# Patient Record
Sex: FEMALE | Race: WHITE | NOT HISPANIC OR LATINO | Employment: OTHER | URBAN - METROPOLITAN AREA
[De-identification: names, ages, dates, MRNs, and addresses within clinical notes are randomized per-mention and may not be internally consistent; named-entity substitution may affect disease eponyms.]

---

## 2023-07-12 ENCOUNTER — APPOINTMENT (OUTPATIENT)
Dept: PRIMARY CARE | Facility: CLINIC | Age: 83
End: 2023-07-12
Payer: MEDICARE

## 2023-07-12 DIAGNOSIS — I10 PRIMARY HYPERTENSION: Primary | ICD-10-CM

## 2023-07-12 DIAGNOSIS — K21.9 GASTROESOPHAGEAL REFLUX DISEASE WITHOUT ESOPHAGITIS: ICD-10-CM

## 2023-07-12 DIAGNOSIS — R41.89 COGNITIVE DECLINE: ICD-10-CM

## 2023-07-12 PROCEDURE — 99213 OFFICE O/P EST LOW 20 MIN: CPT | Performed by: INTERNAL MEDICINE

## 2023-07-12 PROCEDURE — 1157F ADVNC CARE PLAN IN RCRD: CPT | Performed by: INTERNAL MEDICINE

## 2023-07-25 ENCOUNTER — OFFICE VISIT (OUTPATIENT)
Dept: PRIMARY CARE | Facility: CLINIC | Age: 83
End: 2023-07-25
Payer: MEDICARE

## 2023-07-25 VITALS — SYSTOLIC BLOOD PRESSURE: 133 MMHG | DIASTOLIC BLOOD PRESSURE: 76 MMHG

## 2023-07-25 DIAGNOSIS — S09.90XA TRAUMATIC INJURY OF HEAD, INITIAL ENCOUNTER: ICD-10-CM

## 2023-07-25 DIAGNOSIS — I10 PRIMARY HYPERTENSION: Primary | ICD-10-CM

## 2023-07-25 DIAGNOSIS — M79.602 PAIN OF LEFT UPPER EXTREMITY: ICD-10-CM

## 2023-07-25 PROCEDURE — 99213 OFFICE O/P EST LOW 20 MIN: CPT | Performed by: INTERNAL MEDICINE

## 2023-07-25 PROCEDURE — 3078F DIAST BP <80 MM HG: CPT | Performed by: INTERNAL MEDICINE

## 2023-07-25 PROCEDURE — 1126F AMNT PAIN NOTED NONE PRSNT: CPT | Performed by: INTERNAL MEDICINE

## 2023-07-25 PROCEDURE — 3075F SYST BP GE 130 - 139MM HG: CPT | Performed by: INTERNAL MEDICINE

## 2023-07-25 PROCEDURE — 1157F ADVNC CARE PLAN IN RCRD: CPT | Performed by: INTERNAL MEDICINE

## 2023-07-25 NOTE — PROGRESS NOTES
Subjective   Patient ID: Teresita De La Paz is a 83 y.o. female who presents for No chief complaint on file..    HPI came in with daughter was in her memory care unit the daughter had seen her the day before and she was okay but the next morning she had swelling of her left side of her face her left arm and tooth was chipped evidently having had a fall in the middle of the night onto her outstretched left arm because there was some markings on the inner portion of her left palm no chest pain no shortness of breath eating okay    Review of Systems    Objective   There were no vitals taken for this visit.    Physical Exam vital signs noted alert repetitive speech ecchymoses over the left cheek to upper teeth chipped large dorsal left forearm bruise no JVD chest clear to auscultation CV regular rate and rhythm S1-S2 extremities no clubbing cyanosis or edema    Assessment/Plan impression htn fall other diagnoses head injury arm pain  Plan this was the second fall in 2 months advised on daughter decluttering the area continuing with current treatments consideration for other therapies at home no change bp management tylenol as needed for head and arm follow-up with the dentist good nutrition hydration and recheck

## 2023-07-29 PROBLEM — I10 BENIGN ESSENTIAL HYPERTENSION: Status: ACTIVE | Noted: 2018-08-22

## 2024-06-12 NOTE — PROGRESS NOTES
Subjective   Patient ID: Teresita De La Paz is a 84 y.o. female who presents for No chief complaint on file..    HPI Met patient for first time see updated front sheet town no chest pain no shortness of breath memory issues stomach has been okay    Past medical history dementia GERD hypertension    Medications aspirin vitamin B12 Aricept lisinopril omeprazole    Allergies Zithromax furosemide sulfa?    Social history no tobacco    Prevention discussed with Di safety and living situation    Review of Systems    Objective   There were no vitals taken for this visit.    Physical Exam vital signs noted blood pressures have been running high normal in the 140+ systolic range sometimes lower for all records alert and oriented NCAT no JVD chest clear to auscultation CV regular rate and rhythm S1-S2 abdomen soft nontender normal active bowel sounds extremities no clubbing cyanosis or edema normal distal pulses    Assessment/Plan impression GERD hypertension dementia   plan continue  With making new surroundings familiar and safe  Continue with memory medication will recheck on weights and blood pressures after  Living accommodations and food source for 1 month continue with medication for cardiovascular general avoidance of NSAIDs already does not smoke and drink continue with PPI then recheck as above

## 2025-01-12 ENCOUNTER — APPOINTMENT (OUTPATIENT)
Dept: RADIOLOGY | Facility: HOSPITAL | Age: 85
End: 2025-01-12
Payer: MEDICAID

## 2025-01-12 ENCOUNTER — HOSPITAL ENCOUNTER (INPATIENT)
Facility: HOSPITAL | Age: 85
LOS: 2 days | Discharge: HOME | End: 2025-01-15
Attending: EMERGENCY MEDICINE | Admitting: FAMILY MEDICINE
Payer: MEDICAID

## 2025-01-12 ENCOUNTER — APPOINTMENT (OUTPATIENT)
Dept: CARDIOLOGY | Facility: HOSPITAL | Age: 85
End: 2025-01-12
Payer: MEDICAID

## 2025-01-12 DIAGNOSIS — R00.1 SINUS BRADYCARDIA: ICD-10-CM
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
## 2025-01-12 DIAGNOSIS — R09.02 HYPOXIA: ICD-10-CM

## 2025-01-12 DIAGNOSIS — I10 BENIGN ESSENTIAL HYPERTENSION: ICD-10-CM

## 2025-01-12 LAB
ALBUMIN SERPL BCP-MCNC: 3.8 G/DL (ref 3.4–5)
ALP SERPL-CCNC: 89 U/L (ref 33–136)
ALT SERPL W P-5'-P-CCNC: 8 U/L (ref 7–45)
ANION GAP SERPL CALC-SCNC: 11 MMOL/L (ref 10–20)
AST SERPL W P-5'-P-CCNC: 12 U/L (ref 9–39)
BASOPHILS # BLD AUTO: 0.02 X10*3/UL (ref 0–0.1)
BASOPHILS NFR BLD AUTO: 0.3 %
BILIRUB SERPL-MCNC: 0.3 MG/DL (ref 0–1.2)
BUN SERPL-MCNC: 23 MG/DL (ref 6–23)
CALCIUM SERPL-MCNC: 9.1 MG/DL (ref 8.6–10.3)
CARDIAC TROPONIN I PNL SERPL HS: 11 NG/L (ref 0–13)
CARDIAC TROPONIN I PNL SERPL HS: 3 NG/L (ref 0–13)
CHLORIDE SERPL-SCNC: 104 MMOL/L (ref 98–107)
CO2 SERPL-SCNC: 27 MMOL/L (ref 21–32)
CREAT SERPL-MCNC: 1.17 MG/DL (ref 0.5–1.05)
EGFRCR SERPLBLD CKD-EPI 2021: 46 ML/MIN/1.73M*2
EOSINOPHIL # BLD AUTO: 0.16 X10*3/UL (ref 0–0.4)
EOSINOPHIL NFR BLD AUTO: 2.3 %
ERYTHROCYTE [DISTWIDTH] IN BLOOD BY AUTOMATED COUNT: 12.5 % (ref 11.5–14.5)
GLUCOSE SERPL-MCNC: 107 MG/DL (ref 74–99)
HCT VFR BLD AUTO: 38.5 % (ref 36–46)
HGB BLD-MCNC: 12.4 G/DL (ref 12–16)
IMM GRANULOCYTES # BLD AUTO: 0.02 X10*3/UL (ref 0–0.5)
IMM GRANULOCYTES NFR BLD AUTO: 0.3 % (ref 0–0.9)
LYMPHOCYTES # BLD AUTO: 1.6 X10*3/UL (ref 0.8–3)
LYMPHOCYTES NFR BLD AUTO: 23.3 %
MCH RBC QN AUTO: 30.7 PG (ref 26–34)
MCHC RBC AUTO-ENTMCNC: 32.2 G/DL (ref 32–36)
MCV RBC AUTO: 95 FL (ref 80–100)
MONOCYTES # BLD AUTO: 0.73 X10*3/UL (ref 0.05–0.8)
MONOCYTES NFR BLD AUTO: 10.6 %
NEUTROPHILS # BLD AUTO: 4.34 X10*3/UL (ref 1.6–5.5)
NEUTROPHILS NFR BLD AUTO: 63.2 %
NRBC BLD-RTO: 0 /100 WBCS (ref 0–0)
PLATELET # BLD AUTO: 290 X10*3/UL (ref 150–450)
POTASSIUM SERPL-SCNC: 4.1 MMOL/L (ref 3.5–5.3)
PROT SERPL-MCNC: 6 G/DL (ref 6.4–8.2)
RBC # BLD AUTO: 4.04 X10*6/UL (ref 4–5.2)
SODIUM SERPL-SCNC: 138 MMOL/L (ref 136–145)
WBC # BLD AUTO: 6.9 X10*3/UL (ref 4.4–11.3)

## 2025-01-12 PROCEDURE — 93005 ELECTROCARDIOGRAM TRACING: CPT

## 2025-01-12 PROCEDURE — 85025 COMPLETE CBC W/AUTO DIFF WBC: CPT | Performed by: EMERGENCY MEDICINE

## 2025-01-12 PROCEDURE — 70450 CT HEAD/BRAIN W/O DYE: CPT

## 2025-01-12 PROCEDURE — 90715 TDAP VACCINE 7 YRS/> IM: CPT | Performed by: EMERGENCY MEDICINE

## 2025-01-12 PROCEDURE — 73564 X-RAY EXAM KNEE 4 OR MORE: CPT | Mod: RIGHT SIDE | Performed by: STUDENT IN AN ORGANIZED HEALTH CARE EDUCATION/TRAINING PROGRAM

## 2025-01-12 PROCEDURE — 36415 COLL VENOUS BLD VENIPUNCTURE: CPT | Performed by: EMERGENCY MEDICINE

## 2025-01-12 PROCEDURE — 73130 X-RAY EXAM OF HAND: CPT | Mod: LT

## 2025-01-12 PROCEDURE — 71045 X-RAY EXAM CHEST 1 VIEW: CPT

## 2025-01-12 PROCEDURE — 72170 X-RAY EXAM OF PELVIS: CPT

## 2025-01-12 PROCEDURE — 84484 ASSAY OF TROPONIN QUANT: CPT | Performed by: EMERGENCY MEDICINE

## 2025-01-12 PROCEDURE — 76376 3D RENDER W/INTRP POSTPROCES: CPT | Performed by: SURGERY

## 2025-01-12 PROCEDURE — 70486 CT MAXILLOFACIAL W/O DYE: CPT

## 2025-01-12 PROCEDURE — 2500000001 HC RX 250 WO HCPCS SELF ADMINISTERED DRUGS (ALT 637 FOR MEDICARE OP): Performed by: EMERGENCY MEDICINE

## 2025-01-12 PROCEDURE — 72170 X-RAY EXAM OF PELVIS: CPT | Performed by: STUDENT IN AN ORGANIZED HEALTH CARE EDUCATION/TRAINING PROGRAM

## 2025-01-12 PROCEDURE — 70486 CT MAXILLOFACIAL W/O DYE: CPT | Performed by: SURGERY

## 2025-01-12 PROCEDURE — 90471 IMMUNIZATION ADMIN: CPT | Performed by: EMERGENCY MEDICINE

## 2025-01-12 PROCEDURE — 71045 X-RAY EXAM CHEST 1 VIEW: CPT | Performed by: STUDENT IN AN ORGANIZED HEALTH CARE EDUCATION/TRAINING PROGRAM

## 2025-01-12 PROCEDURE — 72125 CT NECK SPINE W/O DYE: CPT | Performed by: SURGERY

## 2025-01-12 PROCEDURE — 2500000004 HC RX 250 GENERAL PHARMACY W/ HCPCS (ALT 636 FOR OP/ED): Performed by: EMERGENCY MEDICINE

## 2025-01-12 PROCEDURE — 70450 CT HEAD/BRAIN W/O DYE: CPT | Performed by: SURGERY

## 2025-01-12 PROCEDURE — 99285 EMERGENCY DEPT VISIT HI MDM: CPT | Mod: 25 | Performed by: EMERGENCY MEDICINE

## 2025-01-12 PROCEDURE — 73564 X-RAY EXAM KNEE 4 OR MORE: CPT | Mod: RT

## 2025-01-12 PROCEDURE — 73130 X-RAY EXAM OF HAND: CPT | Mod: LEFT SIDE | Performed by: STUDENT IN AN ORGANIZED HEALTH CARE EDUCATION/TRAINING PROGRAM

## 2025-01-12 PROCEDURE — 83880 ASSAY OF NATRIURETIC PEPTIDE: CPT | Performed by: NURSE PRACTITIONER

## 2025-01-12 PROCEDURE — 82565 ASSAY OF CREATININE: CPT | Performed by: EMERGENCY MEDICINE

## 2025-01-12 PROCEDURE — 76377 3D RENDER W/INTRP POSTPROCES: CPT

## 2025-01-12 PROCEDURE — 72125 CT NECK SPINE W/O DYE: CPT

## 2025-01-12 RX ORDER — LIDOCAINE HYDROCHLORIDE 10 MG/ML
10 INJECTION, SOLUTION INFILTRATION; PERINEURAL ONCE
Status: COMPLETED | OUTPATIENT
Start: 2025-01-12 | End: 2025-01-13

## 2025-01-12 RX ORDER — OXYMETAZOLINE HCL 0.05 %
2 SPRAY, NON-AEROSOL (ML) NASAL ONCE
Status: COMPLETED | OUTPATIENT
Start: 2025-01-12 | End: 2025-01-12

## 2025-01-12 RX ORDER — ACETAMINOPHEN 325 MG/1
975 TABLET ORAL ONCE
Status: COMPLETED | OUTPATIENT
Start: 2025-01-12 | End: 2025-01-12

## 2025-01-12 RX ADMIN — OXYMETAZOLINE HYDROCHLORIDE 2 SPRAY: 0.5 SPRAY NASAL at 22:31

## 2025-01-12 RX ADMIN — ACETAMINOPHEN 975 MG: 325 TABLET, FILM COATED ORAL at 22:30

## 2025-01-12 RX ADMIN — TETANUS TOXOID, REDUCED DIPHTHERIA TOXOID AND ACELLULAR PERTUSSIS VACCINE, ADSORBED 0.5 ML: 5; 2.5; 8; 8; 2.5 SUSPENSION INTRAMUSCULAR at 22:32

## 2025-01-12 ASSESSMENT — PAIN DESCRIPTION - LOCATION: LOCATION: FINGER (COMMENT WHICH ONE)

## 2025-01-12 ASSESSMENT — PAIN SCALES - GENERAL: PAINLEVEL_OUTOF10: 1

## 2025-01-12 ASSESSMENT — COLUMBIA-SUICIDE SEVERITY RATING SCALE - C-SSRS
1. IN THE PAST MONTH, HAVE YOU WISHED YOU WERE DEAD OR WISHED YOU COULD GO TO SLEEP AND NOT WAKE UP?: NO
2. HAVE YOU ACTUALLY HAD ANY THOUGHTS OF KILLING YOURSELF?: NO
6. HAVE YOU EVER DONE ANYTHING, STARTED TO DO ANYTHING, OR PREPARED TO DO ANYTHING TO END YOUR LIFE?: NO

## 2025-01-12 ASSESSMENT — PAIN DESCRIPTION - PROGRESSION: CLINICAL_PROGRESSION: NOT CHANGED

## 2025-01-12 ASSESSMENT — PAIN - FUNCTIONAL ASSESSMENT: PAIN_FUNCTIONAL_ASSESSMENT: 0-10

## 2025-01-13 ENCOUNTER — APPOINTMENT (OUTPATIENT)
Dept: CARDIOLOGY | Facility: HOSPITAL | Age: 85
End: 2025-01-13
Payer: MEDICAID
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
## 2025-01-13 LAB
APPEARANCE UR: CLEAR
ATRIAL RATE: 82 BPM
ATRIAL RATE: 88 BPM
BILIRUB UR STRIP.AUTO-MCNC: NEGATIVE MG/DL
BNP SERPL-MCNC: 16 PG/ML (ref 0–99)
COLOR UR: NORMAL
FLUAV RNA RESP QL NAA+PROBE: NOT DETECTED
FLUBV RNA RESP QL NAA+PROBE: NOT DETECTED
GLUCOSE UR STRIP.AUTO-MCNC: NORMAL MG/DL
KETONES UR STRIP.AUTO-MCNC: NEGATIVE MG/DL
LEUKOCYTE ESTERASE UR QL STRIP.AUTO: NEGATIVE
NITRITE UR QL STRIP.AUTO: NEGATIVE
P AXIS: 66 DEGREES
P AXIS: 73 DEGREES
P OFFSET: 195 MS
P OFFSET: 200 MS
P ONSET: 135 MS
P ONSET: 138 MS
PH UR STRIP.AUTO: 5.5 [PH]
PR INTERVAL: 170 MS
PR INTERVAL: 176 MS
PROT UR STRIP.AUTO-MCNC: NEGATIVE MG/DL
Q ONSET: 223 MS
Q ONSET: 223 MS
QRS COUNT: 13 BEATS
QRS COUNT: 14 BEATS
QRS DURATION: 136 MS
QRS DURATION: 144 MS
QT INTERVAL: 388 MS
QT INTERVAL: 422 MS
QTC CALCULATION(BAZETT): 469 MS
QTC CALCULATION(BAZETT): 493 MS
QTC FREDERICIA: 441 MS
QTC FREDERICIA: 468 MS
R AXIS: 79 DEGREES
R AXIS: 87 DEGREES
RBC # UR STRIP.AUTO: NEGATIVE /UL
SARS-COV-2 RNA RESP QL NAA+PROBE: NOT DETECTED
SP GR UR STRIP.AUTO: 1.01
T AXIS: 23 DEGREES
T AXIS: 38 DEGREES
T OFFSET: 417 MS
T OFFSET: 434 MS
UROBILINOGEN UR STRIP.AUTO-MCNC: NORMAL MG/DL
VENTRICULAR RATE: 82 BPM
VENTRICULAR RATE: 88 BPM

## 2025-01-13 PROCEDURE — 93005 ELECTROCARDIOGRAM TRACING: CPT

## 2025-01-13 PROCEDURE — 99255 IP/OBS CONSLTJ NEW/EST HI 80: CPT | Performed by: STUDENT IN AN ORGANIZED HEALTH CARE EDUCATION/TRAINING PROGRAM

## 2025-01-13 PROCEDURE — 87636 SARSCOV2 & INF A&B AMP PRB: CPT | Performed by: EMERGENCY MEDICINE

## 2025-01-13 PROCEDURE — 2500000004 HC RX 250 GENERAL PHARMACY W/ HCPCS (ALT 636 FOR OP/ED): Performed by: EMERGENCY MEDICINE

## 2025-01-13 PROCEDURE — 81003 URINALYSIS AUTO W/O SCOPE: CPT | Performed by: EMERGENCY MEDICINE

## 2025-01-13 PROCEDURE — 1200000002 HC GENERAL ROOM WITH TELEMETRY DAILY

## 2025-01-13 PROCEDURE — 2500000001 HC RX 250 WO HCPCS SELF ADMINISTERED DRUGS (ALT 637 FOR MEDICARE OP): Performed by: EMERGENCY MEDICINE

## 2025-01-13 PROCEDURE — 2500000005 HC RX 250 GENERAL PHARMACY W/O HCPCS: Performed by: NURSE PRACTITIONER

## 2025-01-13 PROCEDURE — 2500000004 HC RX 250 GENERAL PHARMACY W/ HCPCS (ALT 636 FOR OP/ED): Performed by: NURSE PRACTITIONER

## 2025-01-13 PROCEDURE — 0XQRXZZ REPAIR LEFT MIDDLE FINGER, EXTERNAL APPROACH: ICD-10-PCS | Performed by: EMERGENCY MEDICINE

## 2025-01-13 PROCEDURE — 0XQTXZZ REPAIR LEFT RING FINGER, EXTERNAL APPROACH: ICD-10-PCS | Performed by: EMERGENCY MEDICINE

## 2025-01-13 RX ORDER — POLYETHYLENE GLYCOL 3350 17 G/17G
17 POWDER, FOR SOLUTION ORAL DAILY
Status: DISCONTINUED | OUTPATIENT
Start: 2025-01-13 | End: 2025-01-15 | Stop reason: HOSPADM

## 2025-01-13 RX ORDER — PANTOPRAZOLE SODIUM 40 MG/1
40 TABLET, DELAYED RELEASE ORAL
Status: DISCONTINUED | OUTPATIENT
Start: 2025-01-14 | End: 2025-01-15 | Stop reason: HOSPADM

## 2025-01-13 RX ORDER — ARIPIPRAZOLE 2 MG/1
2 TABLET ORAL DAILY
COMMUNITY

## 2025-01-13 RX ORDER — TALC
3 POWDER (GRAM) TOPICAL NIGHTLY
Status: DISCONTINUED | OUTPATIENT
Start: 2025-01-13 | End: 2025-01-15 | Stop reason: HOSPADM

## 2025-01-13 RX ORDER — CALCIUM CARBONATE 300MG(750)
400 TABLET,CHEWABLE ORAL DAILY
COMMUNITY
End: 2025-01-15 | Stop reason: HOSPADM

## 2025-01-13 RX ORDER — ONDANSETRON 4 MG/1
4 TABLET, FILM COATED ORAL EVERY 8 HOURS PRN
Status: DISCONTINUED | OUTPATIENT
Start: 2025-01-13 | End: 2025-01-15 | Stop reason: HOSPADM

## 2025-01-13 RX ORDER — OMEPRAZOLE 20 MG/1
1 CAPSULE, DELAYED RELEASE ORAL DAILY
COMMUNITY
Start: 2023-02-06

## 2025-01-13 RX ORDER — ONDANSETRON 4 MG/1
4 TABLET, FILM COATED ORAL EVERY 8 HOURS PRN
Status: CANCELLED | OUTPATIENT
Start: 2025-01-13

## 2025-01-13 RX ORDER — LISINOPRIL 10 MG/1
10 TABLET ORAL
COMMUNITY
End: 2025-01-15 | Stop reason: HOSPADM

## 2025-01-13 RX ORDER — ESTRADIOL 0.1 MG/G
1 CREAM VAGINAL
COMMUNITY
Start: 2023-02-06

## 2025-01-13 RX ORDER — AMMONIUM LACTATE 12 G/100G
1 CREAM TOPICAL AS NEEDED
COMMUNITY

## 2025-01-13 RX ORDER — BACITRACIN ZINC 500 UNIT/G
OINTMENT (GRAM) TOPICAL ONCE
Status: COMPLETED | OUTPATIENT
Start: 2025-01-13 | End: 2025-01-13

## 2025-01-13 RX ORDER — CHOLECALCIFEROL (VITAMIN D3) 50 MCG
50 TABLET ORAL DAILY
COMMUNITY

## 2025-01-13 RX ORDER — POLYETHYLENE GLYCOL 3350 17 G/17G
17 POWDER, FOR SOLUTION ORAL DAILY
Status: CANCELLED | OUTPATIENT
Start: 2025-01-13

## 2025-01-13 RX ORDER — CHOLECALCIFEROL (VITAMIN D3) 25 MCG
2000 TABLET ORAL DAILY
Status: DISCONTINUED | OUTPATIENT
Start: 2025-01-13 | End: 2025-01-15 | Stop reason: HOSPADM

## 2025-01-13 RX ORDER — ONDANSETRON HYDROCHLORIDE 2 MG/ML
4 INJECTION, SOLUTION INTRAVENOUS EVERY 8 HOURS PRN
Status: CANCELLED | OUTPATIENT
Start: 2025-01-13

## 2025-01-13 RX ORDER — VIT C/E/ZN/COPPR/LUTEIN/ZEAXAN 250MG-90MG
1000 CAPSULE ORAL DAILY
COMMUNITY

## 2025-01-13 RX ORDER — ESTRADIOL 0.1 MG/G
1 CREAM VAGINAL
Status: DISCONTINUED | OUTPATIENT
Start: 2025-01-13 | End: 2025-01-15 | Stop reason: HOSPADM

## 2025-01-13 RX ORDER — LANOLIN ALCOHOL/MO/W.PET/CERES
1000 CREAM (GRAM) TOPICAL DAILY
Status: DISCONTINUED | OUTPATIENT
Start: 2025-01-13 | End: 2025-01-15 | Stop reason: HOSPADM

## 2025-01-13 RX ORDER — ONDANSETRON HYDROCHLORIDE 2 MG/ML
4 INJECTION, SOLUTION INTRAVENOUS EVERY 8 HOURS PRN
Status: DISCONTINUED | OUTPATIENT
Start: 2025-01-13 | End: 2025-01-15 | Stop reason: HOSPADM

## 2025-01-13 RX ORDER — ENOXAPARIN SODIUM 100 MG/ML
40 INJECTION SUBCUTANEOUS EVERY 24 HOURS
Status: DISCONTINUED | OUTPATIENT
Start: 2025-01-13 | End: 2025-01-15 | Stop reason: HOSPADM

## 2025-01-13 RX ORDER — ARIPIPRAZOLE 2 MG/1
2 TABLET ORAL DAILY
Status: DISCONTINUED | OUTPATIENT
Start: 2025-01-14 | End: 2025-01-15 | Stop reason: HOSPADM

## 2025-01-13 RX ADMIN — BACITRACIN ZINC: 500 OINTMENT TOPICAL at 04:52

## 2025-01-13 RX ADMIN — Medication 3 MG: at 22:10

## 2025-01-13 RX ADMIN — LIDOCAINE HYDROCHLORIDE 10 ML: 10 INJECTION, SOLUTION INFILTRATION; PERINEURAL at 00:59

## 2025-01-13 RX ADMIN — ENOXAPARIN SODIUM 40 MG: 40 INJECTION SUBCUTANEOUS at 22:11

## 2025-01-13 SDOH — SOCIAL STABILITY: SOCIAL INSECURITY: ABUSE: ADULT

## 2025-01-13 SDOH — SOCIAL STABILITY: SOCIAL INSECURITY: DO YOU FEEL UNSAFE GOING BACK TO THE PLACE WHERE YOU ARE LIVING?: NO

## 2025-01-13 SDOH — SOCIAL STABILITY: SOCIAL INSECURITY: DO YOU FEEL ANYONE HAS EXPLOITED OR TAKEN ADVANTAGE OF YOU FINANCIALLY OR OF YOUR PERSONAL PROPERTY?: NO

## 2025-01-13 SDOH — SOCIAL STABILITY: SOCIAL INSECURITY: ARE THERE ANY APPARENT SIGNS OF INJURIES/BEHAVIORS THAT COULD BE RELATED TO ABUSE/NEGLECT?: NO

## 2025-01-13 SDOH — SOCIAL STABILITY: SOCIAL INSECURITY: HAVE YOU HAD ANY THOUGHTS OF HARMING ANYONE ELSE?: NO

## 2025-01-13 SDOH — SOCIAL STABILITY: SOCIAL INSECURITY: ARE YOU OR HAVE YOU BEEN THREATENED OR ABUSED PHYSICALLY, EMOTIONALLY, OR SEXUALLY BY ANYONE?: NO

## 2025-01-13 SDOH — SOCIAL STABILITY: SOCIAL INSECURITY: DOES ANYONE TRY TO KEEP YOU FROM HAVING/CONTACTING OTHER FRIENDS OR DOING THINGS OUTSIDE YOUR HOME?: NO

## 2025-01-13 SDOH — SOCIAL STABILITY: SOCIAL INSECURITY: HAS ANYONE EVER THREATENED TO HURT YOUR FAMILY OR YOUR PETS?: NO

## 2025-01-13 SDOH — SOCIAL STABILITY: SOCIAL INSECURITY: WERE YOU ABLE TO COMPLETE ALL THE BEHAVIORAL HEALTH SCREENINGS?: YES

## 2025-01-13 SDOH — SOCIAL STABILITY: SOCIAL INSECURITY: HAVE YOU HAD THOUGHTS OF HARMING ANYONE ELSE?: YES

## 2025-01-13 ASSESSMENT — COGNITIVE AND FUNCTIONAL STATUS - GENERAL
STANDING UP FROM CHAIR USING ARMS: A LITTLE
DAILY ACTIVITIY SCORE: 18
CLIMB 3 TO 5 STEPS WITH RAILING: A LOT
MOVING FROM LYING ON BACK TO SITTING ON SIDE OF FLAT BED WITH BEDRAILS: A LITTLE
DRESSING REGULAR LOWER BODY CLOTHING: A LITTLE
PERSONAL GROOMING: A LITTLE
DRESSING REGULAR UPPER BODY CLOTHING: A LITTLE
TURNING FROM BACK TO SIDE WHILE IN FLAT BAD: A LITTLE
MOVING TO AND FROM BED TO CHAIR: A LITTLE
WALKING IN HOSPITAL ROOM: A LITTLE
TOILETING: A LOT
MOBILITY SCORE: 17
PATIENT BASELINE BEDBOUND: NO
HELP NEEDED FOR BATHING: A LITTLE

## 2025-01-13 ASSESSMENT — ACTIVITIES OF DAILY LIVING (ADL)
WALKS IN HOME: NEEDS ASSISTANCE
DRESSING YOURSELF: NEEDS ASSISTANCE
GROOMING: NEEDS ASSISTANCE
TOILETING: NEEDS ASSISTANCE
JUDGMENT_ADEQUATE_SAFELY_COMPLETE_DAILY_ACTIVITIES: NO
PATIENT'S MEMORY ADEQUATE TO SAFELY COMPLETE DAILY ACTIVITIES?: NO
FEEDING YOURSELF: INDEPENDENT
ASSISTIVE_DEVICE: WALKER
ADEQUATE_TO_COMPLETE_ADL: YES
LACK_OF_TRANSPORTATION: PATIENT UNABLE TO ANSWER
HEARING - RIGHT EAR: FUNCTIONAL
BATHING: NEEDS ASSISTANCE
HEARING - LEFT EAR: FUNCTIONAL

## 2025-01-13 ASSESSMENT — LIFESTYLE VARIABLES
HOW OFTEN DO YOU HAVE A DRINK CONTAINING ALCOHOL: NEVER
HOW MANY STANDARD DRINKS CONTAINING ALCOHOL DO YOU HAVE ON A TYPICAL DAY: PATIENT DOES NOT DRINK

## 2025-01-13 ASSESSMENT — PAIN DESCRIPTION - PROGRESSION: CLINICAL_PROGRESSION: GRADUALLY IMPROVING

## 2025-01-13 ASSESSMENT — PAIN SCALES - GENERAL
PAINLEVEL_OUTOF10: 0 - NO PAIN

## 2025-01-13 ASSESSMENT — PAIN - FUNCTIONAL ASSESSMENT: PAIN_FUNCTIONAL_ASSESSMENT: 0-10

## 2025-01-13 ASSESSMENT — PATIENT HEALTH QUESTIONNAIRE - PHQ9: 1. LITTLE INTEREST OR PLEASURE IN DOING THINGS: NOT AT ALL

## 2025-01-13 NOTE — CONSULTS
Inpatient consult to Cardiology  Consult performed by: Raji PAK MD  Consult ordered by: Jak Cortez, APRN-CNP  Reason for consult: Fall, bradycardia, anemia        History Of Present Illness:    Teresita De La Paz is a 84 y.o. female with past medical history significant for Hypertension,  Sinus bradycardia, Right bundle branch block, Renal cell carcinoma s/p right nephrectomy 10/2017, Pulmonary nodule, history of falls, Alzheimer's dementia, GERD. Presented with unwitnessed fall. Cardiology is consulted for fall, bradydcardia, anemia.     Patient poor historian. Has underlying history of dementia.  No family at the bedside.  History obtained from chart review. According to ED note, patient presented with unwitnessed fall.  Patient only oriented to self at this time. Reportedly she was found down with inability to state what happened.   Her nose was bloody and she had a laceration to left fingers. EKG on presentation showed normal sinus rhythm HR 88 right bundle branch block. Chest x-ray showed no evidence of acute cardiopulmonary process. CT showed no evidence of acute intracranial abnormality.   No acute facial bone fracture.   No acute cervical spine fracture or malalignment.   XR hand left subtle questionable laceration in the 2nd left digit without associated radiopaque foreign body or acute bony injury and remote ulnar styloid fracture, with mild negative ulnar variance.       XR pelvis showed no evidence of acute fracture or traumatic malalignment of the pelvis. XR knee showed changes of right knee replacement, without evidence of perihardware failure or acute fracture/malalignment.  High sensitivity troponin 3 repeat 11  K 4.1 BUN 23 creatinine 1.17 WBC 6.9 hemoglobin 12.4 hematocrit 38.5 platelets 290.  Initial vital signs showed temp 36.2 HR 78 RR 16 /76 pulse ox 96% on room air She was treated with tylenol 975 mg oral x1.   Laceration was repaired.      According to ED note,patient has  "episode of bradycardia with HR 40s and pulse ox 90 while sleeping.  Repeat EKG showed SR HR 82 right bundle branch block. Reportedly she ambulated and heart rate dropped. Patient was asymptomatic. Given bradycardia, she was admitted for evaluation.   At present she denies any complains.      Of note, patient with prior history of sinus bradycardia. She was seen by inpatient cardiology team in 2021 for bradycardia. Echo at that time was stable.     Home CV meds  Lisinopril 10 mg daily  hydrochlorothiazide  12.5 mg daily          Last Recorded Vitals:  Vitals:    01/13/25 0941 01/13/25 1048 01/13/25 1254 01/13/25 1400   BP: 122/87 121/87 122/87 126/87   BP Location: Left arm Left arm Left arm Left arm   Patient Position: Sitting Sitting Sitting Sitting   Pulse: 87 81 81 68   Resp: 18 16 18 15   Temp:       TempSrc:       SpO2: 98% 97% 98% 98%   Weight:       Height:           Last Labs:  LABS:  CMP:  Results from last 7 days   Lab Units 01/12/25  2226   SODIUM mmol/L 138   POTASSIUM mmol/L 4.1   CHLORIDE mmol/L 104   CO2 mmol/L 27   ANION GAP mmol/L 11   BUN mg/dL 23   CREATININE mg/dL 1.17*   EGFR mL/min/1.73m*2 46*   ALBUMIN g/dL 3.8   ALT U/L 8   AST U/L 12   BILIRUBIN TOTAL mg/dL 0.3     CBC:  Results from last 7 days   Lab Units 01/12/25  2226   WBC AUTO x10*3/uL 6.9   HEMOGLOBIN g/dL 12.4   HEMATOCRIT % 38.5   PLATELETS AUTO x10*3/uL 290   MCV fL 95     COAG:     ABO: No results found for: \"ABO\"  HEME/ENDO:     CARDIAC:   Results from last 7 days   Lab Units 01/12/25  2326 01/12/25  2226   TROPHS ng/L 11 3   No results for input(s): \"CHOL\", \"LDLF\", \"LDL\", \"LDLCALC\", \"HDL\", \"TRIG\" in the last 21509 hours.     Imagine Results  XR hand left 3+ views   Final Result   1.  Subtle questionable laceration in the 2nd left digit without   associated radiopaque foreign body or acute bony injury.   2. Remote ulnar styloid fracture, with mild negative ulnar variance.                  MACRO:   None        Signed by: Ravi " Philip 1/12/2025 11:54 PM   Dictation workstation:   [id number]      XR pelvis 1-2 views   Final Result   No evidence of acute fracture or traumatic malalignment of the pelvis.             MACRO:   None        Signed by: Ravi Palacios 1/12/2025 11:31 PM   Dictation workstation:   [id number]      XR knee right 4+ views   Final Result   Changes of right knee replacement, without evidence of perihardware   failure or acute fracture/malalignment.             MACRO:   None        Signed by: Ravi Palacios 1/12/2025 11:29 PM   Dictation workstation:   [id number]      XR chest 1 view   Final Result   1.  No evidence of acute cardiopulmonary process.                  MACRO:   None        Signed by: Ravi Palacios 1/12/2025 11:30 PM   Dictation workstation:   [id number]      CT head wo IV contrast   Final Result   No evidence of acute intracranial abnormality.        No acute facial bone fracture.        No acute cervical spine fracture or malalignment.             MACRO:   None        Signed by: Manpreet Leigh 1/12/2025 11:01 PM   Dictation workstation:   [id number]      CT cervical spine wo IV contrast   Final Result   No evidence of acute intracranial abnormality.        No acute facial bone fracture.        No acute cervical spine fracture or malalignment.             MACRO:   None        Signed by: Manpreet Leigh 1/12/2025 11:01 PM   Dictation workstation:   [id number]      CT maxillofacial bones wo IV contrast   Final Result   No evidence of acute intracranial abnormality.        No acute facial bone fracture.        No acute cervical spine fracture or malalignment.             MACRO:   None        Signed by: Manpreet Leigh 1/12/2025 11:01 PM   Dictation workstation:   [id number]      CT 3D reconstruction   Final Result   No evidence of acute intracranial abnormality.        No acute facial bone fracture.        No acute cervical spine fracture or malalignment.             MACRO:   None         Signed by: Manpreet Leigh 2025 11:01 PM   Dictation workstation:   [id number]             Last I/O:  No intake/output data recorded.    Past Cardiology Tests (Last 3 Years):  EKG:  ECG 12 lead 2025       ECG 12 lead 2025       Echo:  2021   1. The left ventricular systolic function is normal with a 60-65% estimated ejection fraction.   2. Poorly visualized anatomical structures due to suboptimal image quality.   3. Spectral Doppler shows an impaired relaxation pattern of left ventricular diastolic filling.   4. No prior echocardiogram available for comparison.    2020- [facility]    1. The estimated EF is 55-65% consistent with normal LV function.    2. Grade 1 diastolic dysfunction pattern of LV diastolic filling.    3. Aortic valve is mild sclerotic.     Cath:  No results found for this or any previous visit from the past 1095 days.    Stress Test:  Echocardiogram stress 2021- [facility]    Summary:    1. Normal LVEF augmentation with stress > 70%.    2. Normal resting LVEF of 55%.    3. Normal stress echocardiogram imaging.    4. Normal stress electrocardiogram.    5. The blood pressure response was hypertensive.     Cardiac Imaging:  No results found for this or any previous visit from the past 1095 days.      Past Medical History:  She has a past medical history of Personal history of other diseases of the digestive system (2021) and Personal history of other diseases of the digestive system (2021).    Past Surgical History:  She has a past surgical history that includes Total knee arthroplasty (2017);  section, classic (2017); and Hysterectomy (2017).      Social History:  She has no history on file for tobacco use, alcohol use, and drug use.    Family History:  No family history on file.     Allergies:  Azithromycin, Lasix [furosemide], and Sulfa (sulfonamide antibiotics)    Inpatient Medications:  Scheduled medications   Medication Dose  Route Frequency    ARIPiprazole  2 mg oral Daily    cholecalciferol  2,000 Units oral Daily    cyanocobalamin  1,000 mcg oral Daily    enoxaparin  40 mg subcutaneous q24h    estradiol  1 g vaginal Once per day on Monday Thursday    melatonin  3 mg oral Nightly    [START ON 1/14/2025] pantoprazole  40 mg oral Daily before breakfast    polyethylene glycol  17 g oral Daily     PRN medications   Medication    ondansetron    Or    ondansetron     Continuous Medications   Medication Dose Last Rate     Outpatient Medications:  Current Outpatient Medications   Medication Instructions    ammonium lactate (Amlactin) 12 % cream 1 Application, Topical, As needed    ARIPiprazole (ABILIFY) 2 mg, oral, Daily, FOR MOOD    cholecalciferol (VITAMIN D-3) 50 mcg, oral, Daily    cyanocobalamin (VITAMIN B-12) 1,000 mcg, oral, Daily    estradiol (ESTRACE) 1 g    lisinopril 10 mg, oral, Daily RT    magnesium oxide (MAG-OX) 400 mg, oral, Daily    omeprazole (PriLOSEC) 20 mg DR capsule 1 capsule, Daily       Physical Exam:  GENERAL: alert x1 hx of dementia in no acute distress  SKIN: warm, dry  NECK: no JVD  CARDIAC: Regular rate and rhythm no murmurs  PULMONARY: Normal respiratory efforts, lungs clear to auscultation bilaterally.  ABDOMEN: soft, nondistended  EXTREMITIES: no lower extremity edema  NEURO: Alert and oriented x 3.  Grossly normal.  Moves all 4 extremities.      Assessment/Plan   Teresita De La Paz is a 84 y.o. female with past medical history significant for Hypertension,  Sinus bradycardia, Right bundle branch block, Renal cell carcinoma s/p right nephrectomy 10/2017, Pulmonary nodule, history of falls, Alzheimer's dementia, GERD. Presented with unwitnessed fall. Cardiology is consulted for fall, bradydcardia, anemia.     #Sinus bradycardia  No evidence of significant pauses or high grade AV block noted on telemetry.   Will check TSH   - We will obtain a transthoracic echocardiogram for structural evaluation including ejection  fraction, assessment of regional wall motion abnormalities or valvular disease, and further evaluation of hemodynamics.      #Lower extremity edema  Most consistent with venous insuffiencey    Chest xray with no pulmonary edema   Will check BNP     #HTN   BP acceptable    Recommendation  Echo   BNP   Compression stocking   Monitor on telemetry while inhouse   If no significant findings, will plan to discharge patient on real time event monitor   The patient will benefit from follow-up in Cardiology clinic within 4 weeks of discharge.     Code Status:  Full Code    Hernestokatie SHORT Mike, APRN-CNP   Thank you for allowing me to participate in their care.  Please feel free to call me with any further questions or concerns.    Raji Crum MD, FACC, ALISHA, [facility]  Division of Cardiovascular Medicine  System Director, Nuclear Cardiology   Medical Director, [facility] [place], [facility]   Clinical , [facility] [employer]  [email or web address]   Office:  [phone]          Both the MIKE and I have had a face to face encounter with the patient today. I have examined the patient and edited the documented physical examination as necessary.  I personally reviewed the patient's vital signs, telemetry, recent labs, medications, orders, EKGs, and pertinent cardiac imaging/ echocardiography.  I have reviewed the MIKE's encounter note, approve the MIKE's documentation and have edited the note to reflect my diagnostic and therapeutic plan.

## 2025-01-13 NOTE — PROGRESS NOTES
Transitional Care Coordination Progress Note:  Plan per Medical/Surgical team: treatment of fall with bloody nose, bit lip & lacerations to 2 middle fingers- sutured   Status: Inpatient   Payor source: medicaid   Discharge disposition:  [facility]  Finger laceration wound care   Lip wound care  Potential Barriers: dementia  ADOD: 1/15/2025  SANIA Mcdonald RN, BSN Transitional Care Coordinator ED# [phone]      01/13/25 0916   Discharge Planning   Living Arrangements Alone   Support Systems Children   Assistance Needed Finger laceration wound care   Lip wound care   Type of Residence Assisted living   Do you have animals or pets at home? No   Care Facility Name [facility]   Home or Post Acute Services Post acute facilities (Rehab/SNF/etc)   Type of Post Acute Facility Services Assisted living   Expected Discharge Disposition Home   Does the patient need discharge transport arranged? Yes   RoundTrip coordination needed? Yes   Has discharge transport been arranged? No   Financial Resource Strain   How hard is it for you to pay for the very basics like food, housing, medical care, and heating? Pt Unable   Housing Stability   In the last 12 months, was there a time when you were not able to pay the mortgage or rent on time? Pt Unable   In the past 12 months, how many times have you moved where you were living? 1   At any time in the past 12 months, were you homeless or living in a shelter (including now)? Pt Unable   Transportation Needs   In the past 12 months, has lack of transportation kept you from medical appointments or from getting medications? Pt Unable   In the past 12 months, has lack of transportation kept you from meetings, work, or from getting things needed for daily living? Pt Unable   Patient Choice   Provider Choice list and CMS website (https://medicare.gov/care-compare#search) for post-acute Quality and Resource Measure Data were provided and reviewed with:  Family   Patient / Family choosing to utilize agency / facility established prior to hospitalization Yes   Stroke Family Assessment   Stroke Family Assessment Needed No   Intensity of Service   Intensity of Service 0-30 min

## 2025-01-13 NOTE — H&P
History Of Present Illness  Teresita De La Paz is a 84 y.o. female presenting with history including Alzheimer's dementia, hypertension, GERD presenting to the emergency department from her nursing facility for an unwitnessed fall. .    Pt was evaluated in the ED and noted to have bradycardia and there was concern it it may have caused syncope and hence pt admitted  She is not able to provide any information   She is frogetful  N pain no difficulty breathing      Past Medical History  She has a past medical history of Personal history of other diseases of the digestive system (2021) and Personal history of other diseases of the digestive system (2021).    Surgical History  She has a past surgical history that includes Total knee arthroplasty (2017);  section, classic (2017); and Hysterectomy (2017).     Social History  She has no history on file for tobacco use, alcohol use, and drug use.    Family History  No family history on file.     Allergies  Azithromycin, Lasix [furosemide], and Sulfa (sulfonamide antibiotics)    Review of Systems     No chest pain   No shortness of breath  No cough  No fever  No chills  No nausea vomitign or constipation   No abdo pain   No wt loss  No change in urine     Physical Exam     Well developed well nurished  No distress  Ao 0  Face symmetrical   Neck no jvd no bruit  Chest clear  CVS regular  Ext ntrace edema  Abdo soft nontender bs active, no masses  Cns alert forgetful   Able to move ext  Skin intact  Psych normal affect  Left hand fingers are strapped     Last Recorded Vitals  /87 (BP Location: Left arm, Patient Position: Sitting)   Pulse 81   Temp 36.2 °C (97.1 °F) (Oral)   Resp 18   Wt 68 kg (150 lb)   SpO2 98%     Relevant Results    Scheduled medications  [START ON 2025] ARIPiprazole, 2 mg, oral, Daily  cholecalciferol, 2,000 Units, oral, Daily  cyanocobalamin, 1,000 mcg, oral, Daily  enoxaparin, 40 mg, subcutaneous,  q24h  estradiol, 1 g, vaginal, Once per day on Monday Thursday  melatonin, 3 mg, oral, Nightly  [START ON 1/14/2025] pantoprazole, 40 mg, oral, Daily before breakfast  polyethylene glycol, 17 g, oral, Daily      Continuous medications     PRN medications  PRN medications: ondansetron **OR** ondansetron  Results for orders placed or performed during the hospital encounter of 01/12/25 (from the past 96 hours)   CBC and Auto Differential   Result Value Ref Range    WBC 6.9 4.4 - 11.3 x10*3/uL    nRBC 0.0 0.0 - 0.0 /100 WBCs    RBC 4.04 4.00 - 5.20 x10*6/uL    Hemoglobin 12.4 12.0 - 16.0 g/dL    Hematocrit 38.5 36.0 - 46.0 %    MCV 95 80 - 100 fL    MCH 30.7 26.0 - 34.0 pg    MCHC 32.2 32.0 - 36.0 g/dL    RDW 12.5 11.5 - 14.5 %    Platelets 290 150 - 450 x10*3/uL    Neutrophils % 63.2 40.0 - 80.0 %    Immature Granulocytes %, Automated 0.3 0.0 - 0.9 %    Lymphocytes % 23.3 13.0 - 44.0 %    Monocytes % 10.6 2.0 - 10.0 %    Eosinophils % 2.3 0.0 - 6.0 %    Basophils % 0.3 0.0 - 2.0 %    Neutrophils Absolute 4.34 1.60 - 5.50 x10*3/uL    Immature Granulocytes Absolute, Automated 0.02 0.00 - 0.50 x10*3/uL    Lymphocytes Absolute 1.60 0.80 - 3.00 x10*3/uL    Monocytes Absolute 0.73 0.05 - 0.80 x10*3/uL    Eosinophils Absolute 0.16 0.00 - 0.40 x10*3/uL    Basophils Absolute 0.02 0.00 - 0.10 x10*3/uL   Comprehensive metabolic panel   Result Value Ref Range    Glucose 107 (H) 74 - 99 mg/dL    Sodium 138 136 - 145 mmol/L    Potassium 4.1 3.5 - 5.3 mmol/L    Chloride 104 98 - 107 mmol/L    Bicarbonate 27 21 - 32 mmol/L    Anion Gap 11 10 - 20 mmol/L    Urea Nitrogen 23 6 - 23 mg/dL    Creatinine 1.17 (H) 0.50 - 1.05 mg/dL    eGFR 46 (L) >60 mL/min/1.73m*2    Calcium 9.1 8.6 - 10.3 mg/dL    Albumin 3.8 3.4 - 5.0 g/dL    Alkaline Phosphatase 89 33 - 136 U/L    Total Protein 6.0 (L) 6.4 - 8.2 g/dL    AST 12 9 - 39 U/L    Bilirubin, Total 0.3 0.0 - 1.2 mg/dL    ALT 8 7 - 45 U/L   Troponin I, High Sensitivity, Initial   Result Value  Ref Range    Troponin I, High Sensitivity 3 0 - 13 ng/L   B-type Natriuretic Peptide   Result Value Ref Range    BNP 16 0 - 99 pg/mL   ECG 12 lead   Result Value Ref Range    Ventricular Rate 88 BPM    Atrial Rate 88 BPM    HI Interval 176 ms    QRS Duration 136 ms    QT Interval 388 ms    QTC Calculation(Bazett) 469 ms    P Axis 66 degrees    R Axis 79 degrees    T Axis 38 degrees    QRS Count 14 beats    Q Onset 223 ms    P Onset 135 ms    P Offset 200 ms    T Offset 417 ms    QTC Fredericia 441 ms   Troponin, High Sensitivity, 1 Hour   Result Value Ref Range    Troponin I, High Sensitivity 11 0 - 13 ng/L   ECG 12 lead   Result Value Ref Range    Ventricular Rate 82 BPM    Atrial Rate 82 BPM    HI Interval 170 ms    QRS Duration 144 ms    QT Interval 422 ms    QTC Calculation(Bazett) 493 ms    P Axis 73 degrees    R Axis 87 degrees    T Axis 23 degrees    QRS Count 13 beats    Q Onset 223 ms    P Onset 138 ms    P Offset 195 ms    T Offset 434 ms    QTC Fredericia 468 ms   Urinalysis with Reflex Culture and Microscopic   Result Value Ref Range    Color, Urine Light-Yellow Light-Yellow, Yellow, Dark-Yellow    Appearance, Urine Clear Clear    Specific Gravity, Urine 1.011 1.005 - 1.035    pH, Urine 5.5 5.0, 5.5, 6.0, 6.5, 7.0, 7.5, 8.0    Protein, Urine NEGATIVE NEGATIVE, 10 (TRACE), 20 (TRACE) mg/dL    Glucose, Urine Normal Normal mg/dL    Blood, Urine NEGATIVE NEGATIVE    Ketones, Urine NEGATIVE NEGATIVE mg/dL    Bilirubin, Urine NEGATIVE NEGATIVE    Urobilinogen, Urine Normal Normal mg/dL    Nitrite, Urine NEGATIVE NEGATIVE    Leukocyte Esterase, Urine NEGATIVE NEGATIVE   Sars-CoV-2 and Influenza A/B PCR   Result Value Ref Range    Flu A Result Not Detected Not Detected    Flu B Result Not Detected Not Detected    Coronavirus 2019, PCR Not Detected Not Detected         No current facility-administered medications on file prior to encounter.     Current Outpatient Medications on File Prior to Encounter    Medication Sig Dispense Refill    estradiol (Estrace) 0.01 % (0.1 mg/gram) vaginal cream Insert 0.25 Applicatorfuls (1 g) into the vagina.  1 gram Mon. and Thurs. at HS vaginally. Put the cream at the entrance to the vagina not in the vagina      omeprazole (PriLOSEC) 20 mg DR capsule Take 1 capsule (20 mg) by mouth once daily.      ammonium lactate (Amlactin) 12 % cream Apply 1 Application topically if needed for dry skin (1-2 TIMES A DAY).      ARIPiprazole (Abilify) 2 mg tablet Take 1 tablet (2 mg) by mouth once daily. FOR MOOD      cholecalciferol (Vitamin D-3) 50 MCG (2000 UT) tablet Take 1 tablet (50 mcg) by mouth once daily.      cyanocobalamin (Vitamin B-12) 500 mcg tablet Take 2 tablets (1,000 mcg) by mouth once daily.      lisinopril 10 mg tablet Take 1 tablet (10 mg) by mouth once daily.      magnesium oxide (Mag-Ox) 400 mg tablet Take 1 tablet (400 mg) by mouth once daily.         Results for orders placed or performed during the hospital encounter of 01/12/25 (from the past 24 hours)   CBC and Auto Differential   Result Value Ref Range    WBC 6.9 4.4 - 11.3 x10*3/uL    nRBC 0.0 0.0 - 0.0 /100 WBCs    RBC 4.04 4.00 - 5.20 x10*6/uL    Hemoglobin 12.4 12.0 - 16.0 g/dL    Hematocrit 38.5 36.0 - 46.0 %    MCV 95 80 - 100 fL    MCH 30.7 26.0 - 34.0 pg    MCHC 32.2 32.0 - 36.0 g/dL    RDW 12.5 11.5 - 14.5 %    Platelets 290 150 - 450 x10*3/uL    Neutrophils % 63.2 40.0 - 80.0 %    Immature Granulocytes %, Automated 0.3 0.0 - 0.9 %    Lymphocytes % 23.3 13.0 - 44.0 %    Monocytes % 10.6 2.0 - 10.0 %    Eosinophils % 2.3 0.0 - 6.0 %    Basophils % 0.3 0.0 - 2.0 %    Neutrophils Absolute 4.34 1.60 - 5.50 x10*3/uL    Immature Granulocytes Absolute, Automated 0.02 0.00 - 0.50 x10*3/uL    Lymphocytes Absolute 1.60 0.80 - 3.00 x10*3/uL    Monocytes Absolute 0.73 0.05 - 0.80 x10*3/uL    Eosinophils Absolute 0.16 0.00 - 0.40 x10*3/uL    Basophils Absolute 0.02 0.00 - 0.10 x10*3/uL   Comprehensive metabolic panel    Result Value Ref Range    Glucose 107 (H) 74 - 99 mg/dL    Sodium 138 136 - 145 mmol/L    Potassium 4.1 3.5 - 5.3 mmol/L    Chloride 104 98 - 107 mmol/L    Bicarbonate 27 21 - 32 mmol/L    Anion Gap 11 10 - 20 mmol/L    Urea Nitrogen 23 6 - 23 mg/dL    Creatinine 1.17 (H) 0.50 - 1.05 mg/dL    eGFR 46 (L) >60 mL/min/1.73m*2    Calcium 9.1 8.6 - 10.3 mg/dL    Albumin 3.8 3.4 - 5.0 g/dL    Alkaline Phosphatase 89 33 - 136 U/L    Total Protein 6.0 (L) 6.4 - 8.2 g/dL    AST 12 9 - 39 U/L    Bilirubin, Total 0.3 0.0 - 1.2 mg/dL    ALT 8 7 - 45 U/L   Troponin I, High Sensitivity, Initial   Result Value Ref Range    Troponin I, High Sensitivity 3 0 - 13 ng/L   ECG 12 lead   Result Value Ref Range    Ventricular Rate 88 BPM    Atrial Rate 88 BPM    OH Interval 176 ms    QRS Duration 136 ms    QT Interval 388 ms    QTC Calculation(Bazett) 469 ms    P Axis 66 degrees    R Axis 79 degrees    T Axis 38 degrees    QRS Count 14 beats    Q Onset 223 ms    P Onset 135 ms    P Offset 200 ms    T Offset 417 ms    QTC Fredericia 441 ms   Troponin, High Sensitivity, 1 Hour   Result Value Ref Range    Troponin I, High Sensitivity 11 0 - 13 ng/L   ECG 12 lead   Result Value Ref Range    Ventricular Rate 82 BPM    Atrial Rate 82 BPM    OH Interval 170 ms    QRS Duration 144 ms    QT Interval 422 ms    QTC Calculation(Bazett) 493 ms    P Axis 73 degrees    R Axis 87 degrees    T Axis 23 degrees    QRS Count 13 beats    Q Onset 223 ms    P Onset 138 ms    P Offset 195 ms    T Offset 434 ms    QTC Fredericia 468 ms   Urinalysis with Reflex Culture and Microscopic   Result Value Ref Range    Color, Urine Light-Yellow Light-Yellow, Yellow, Dark-Yellow    Appearance, Urine Clear Clear    Specific Gravity, Urine 1.011 1.005 - 1.035    pH, Urine 5.5 5.0, 5.5, 6.0, 6.5, 7.0, 7.5, 8.0    Protein, Urine NEGATIVE NEGATIVE, 10 (TRACE), 20 (TRACE) mg/dL    Glucose, Urine Normal Normal mg/dL    Blood, Urine NEGATIVE NEGATIVE    Ketones, Urine NEGATIVE  NEGATIVE mg/dL    Bilirubin, Urine NEGATIVE NEGATIVE    Urobilinogen, Urine Normal Normal mg/dL    Nitrite, Urine NEGATIVE NEGATIVE    Leukocyte Esterase, Urine NEGATIVE NEGATIVE   Sars-CoV-2 and Influenza A/B PCR   Result Value Ref Range    Flu A Result Not Detected Not Detected    Flu B Result Not Detected Not Detected    Coronavirus 2019, PCR Not Detected Not Detected     CT head , faceial bones and also of c spine was negative  Hand xray no fracture     Assessment/Plan   Assessment & Plan  Fall, initial encounter      Dementia     Delerium risk     Left finger laceration     Will cont with current   Consult cardio for concern for bradycardia and syncope  Melatonin  And cont with home meds  Pt seen in ER       ##Transcribed for Dr. BREANNA Palmer##  Pt seen , above updated  Consult cardio   Will follow   Monitor for delerium    Hernesto Palmer MD

## 2025-01-13 NOTE — ED PROVIDER NOTES
Emergency Department Provider Note             History of Present Illness   CC: Finger Laceration (fall) and Fall    History provided by: Patient  Limitations to History: Dementia  External Records Reviewed: prior ED provider notes, clinic notes, discharge summaries    HPI:  Teresita De La Paz is a 84 y.o. female with history including Alzheimer's dementia, hypertension, GERD presenting to the emergency department from her nursing facility for an unwitnessed fall.  Per report from EMS, she was found on the ground with inability to state what happened. She had a bloody nose, bit her lip, and has lacs to her left fingers. Is mentating at baseline. She reports burning to her hand but denies any other symptoms.  Per review of her med list, she is not on anticoagulation.     ---  Past Medical History:   Diagnosis Date    Personal history of other diseases of the digestive system 2021    History of diverticulitis of colon    Personal history of other diseases of the digestive system 2021    History of rectal abscess     Past Surgical History:   Procedure Laterality Date     SECTION, CLASSIC  2017     Section    HYSTERECTOMY  2017    Hysterectomy    TOTAL KNEE ARTHROPLASTY  2017    Knee Replacement       Allergies   Allergen Reactions    Lasix [Furosemide] Unknown    Sulfa (Sulfonamide Antibiotics) Unknown       Physical Exam   Triage vitals:  T 36.2 °C (97.1 °F)  HR 78  /76  RR 16  O2 96 %      General: awake, well-appearing, no distress  Head: normocephalic, atraumatic  Eyes: pupils equal, extraocular movements grossly intact, no conjunctival injection or scleral icterus  ENT: nares patent with residual dried blood primarily right nare,  superficial abrasion to upper lip, does not cross vermillion border, no intra oropharyngeal bleeding, dried blood on lips; moist mucous membranes, midface is stable  Neck: supple, trachea midline, no masses, no C-spine  tenderness  CV: regular rate and rhythm, well-perfused  Resp: breathing is non-labored, speaking in full sentences. Lungs are clear to auscultation bilaterally  GI: soft, non-distended, non-tender, no rebound or guarding  Back: no spinal tenderness  Extremities: no edema, no gross deformity, mild right knee tenderness, bruising to left hand with lacerations to dorsal aspect of 3rd and 4th digits. 3rd digit extensor tendon visualized but is intact and extension fully intact  Neuro: alert, oriented to self, speech is fluent, face is symmetric, moving all extremities without drift in all extremities   Psych: Appropriate mood and affect    ED Course & Medical Decision Making     84 y.o. female with history including Alzheimer's dementia, hypertension, GERD presenting to the emergency department from her nursing facility for an unwitnessed fall.  She is neurovascularly intact.  Exam is notable for residual blood in her nares, primarily her right.  There is dried blood on her lips and a superficial abrasion to her left upper lip that does not cross the vermilion border.  She is oriented to self but this appears to be her baseline.  She was given analgesics and boostrix. No active epistaxis but administered afrin preemptively. Workup initiated. See ED course.     EKG: See ED course.     Results: Independently reviewed and interpreted by me. Please see ED course and [facility] for my full interpretation.     Chronic Medical Conditions Significantly Affecting Care: as per [facility]    Patient was discussed with the following consultants/services:  Dr. Palmer    Care Considerations: as per [facility]    ED Course as of 01/13/25 0435   Sun Jan 12, 2025   2324 Labs are unremarkable including troponin.  [LM]   2324  I reviewed CT scans which showed no acute injury of head, C-spine, face. [LM]   2324 EKG per my interpretation reveals normal sinus rhythm, rate 88, normal axis, right bundle branch block, no significant ischemic changes. [LM]   Mon  Jan 13, 2025   0100 I independently reviewed her x-rays which show no acute fracture or process.  [LM]   0155 I updated the patient and her daughter who is at bedside. Lacs repaired, see procedure note.  [LM]   0248 The patient became bradycardic to the 40s and sats dropped to 90 while sleeping. Repeat EKG shows normal sinus rhythm, rate 82, right bundle branch block, no significant ST changes. She ambulated and pulse ox and heart rate dropped to similar levels. BP remained stable and she didn't become symptomatic. We had initially planned to discharge back to her facility after speaking with her daughter, but given her bradycardia and mild hypoxia and unwitnessed fall, which could have been a syncopal episode, will admit for further monitoring and syncope workup. PE considered given her mild hypoxia but she continues to deny any respiratory symptoms or chest pain, lowering my suspicion for this.  [LM]      ED Course User Index  [LM] Idalia Dexter MD         Diagnoses as of 01/13/25 2455   Fall, initial encounter   Hypoxia   Sinus bradycardia       Disposition   Admission    MD Idalia Rivera MD  01/13/25 3749

## 2025-01-13 NOTE — ED PROCEDURE NOTE
Procedure  Laceration Repair    Performed by: Idalia Dexter MD  Authorized by: Idalia Dexter MD    Consent:     Consent obtained:  Verbal    Consent given by:  Patient    Risks, benefits, and alternatives were discussed: yes      Risks discussed:  Infection, pain, need for additional repair, nerve damage, poor cosmetic result, poor wound healing, tendon damage and vascular damage    Alternatives discussed:  No treatment and delayed treatment  Universal protocol:     Procedure explained and questions answered to patient or proxy's satisfaction: yes      Relevant documents present and verified: yes      Test results available: yes      Imaging studies available: yes      Required blood products, implants, devices, and special equipment available: yes      Patient identity confirmed:  Verbally with patient and arm band  Anesthesia:     Anesthesia method:  Nerve block    Block location:  Digital block of 3rd and 4th digits    Block needle gauge:  24 G    Block anesthetic:  Lidocaine 1% w/o epi    Block injection procedure:  Anatomic landmarks identified, introduced needle, incremental injection, negative aspiration for blood and anatomic landmarks palpated    Block outcome:  Anesthesia achieved  Laceration details:     Location:  Finger    Finger location: L third and fourth digits.    Wound length (cm): 3rd digit: 2cm; 4th digit: 1cm.  Exploration:     Imaging obtained: x-ray      Imaging outcome: foreign body not noted      Wound exploration: wound explored through full range of motion and entire depth of wound visualized    Treatment:     Area cleansed with:  Saline    Amount of cleaning:  Standard    Irrigation solution:  Sterile saline    Irrigation method:  Syringe  Skin repair:     Repair method:  Sutures    Suture size:  4-0    Suture material:  Prolene    Suture technique:  Simple interrupted    Number of sutures: 3rd digit: 5, 4th digit: 3.  Approximation:     Approximation:  Close  Repair type:      Repair type:  Simple  Post-procedure details:     Dressing:  Antibiotic ointment    Procedure completion:  Tolerated well, no immediate complications               Idalia Dexter MD  01/13/25 2989

## 2025-01-13 NOTE — PROGRESS NOTES
Pharmacy Medication History     Source of Information: MEDICATION LIST FROM FACILITY    Additional concerns with the patient's PTA list.     The following updates were made to the Prior to Admission medication list:     Medications ADDED:   AMMONIUM LACTATE 12 % CREAM  MAG- MG  ARIPIPRAZOLE 2 MG  Hydrochlorothiazide 12.5 MG  OMEPRAZOLE 20 MG  LISINOPRIL 10 MG  ESTRADIOL 0.01%  VITAMIN 5-12 1000 MCG  Medications CHANGED:  N/A  Medications REMOVED:   N/A  Medications NOT TAKING:   N/A    Allergy reviewed : Yes    Meds 2 Beds : Yes    Outpatient pharmacy confirmed and updated in chart : Yes    Pharmacy name: [facility]    The list below reflectives the updated PTA list. Please review each medication in order reconciliation for additional clarification and justification.    Prior to Admission Medications   Prescriptions Last Dose Informant     ARIPiprazole (Abilify) 2 mg tablet       Sig: Take 1 tablet (2 mg) by mouth once daily. FOR MOOD   ammonium lactate (Amlactin) 12 % cream       Sig: Apply 1 Application topically if needed for dry skin (1-2 TIMES A DAY).   cholecalciferol (Vitamin D-3) 50 MCG (2000 UT) tablet       Sig: Take 1 tablet (50 mcg) by mouth once daily.   cyanocobalamin (Vitamin B-12) 500 mcg tablet       Sig: Take 2 tablets (1,000 mcg) by mouth once daily.   estradiol (Estrace) 0.01 % (0.1 mg/gram) vaginal cream       Sig: Insert 0.25 Applicatorfuls (1 g) into the vagina.  1 gram Mon. and Thurs. at HS vaginally. Put the cream at the entrance to the vagina not in the vagina   lisinopril 10 mg tablet       Sig: Take 1 tablet (10 mg) by mouth once daily.   magnesium oxide (Mag-Ox) 400 mg tablet       Sig: Take 1 tablet (400 mg) by mouth once daily.   omeprazole (PriLOSEC) 20 mg DR capsule       Sig: Take 1 capsule (20 mg) by mouth once daily.      Facility-Administered Medications: None       The list below reflectives the updated allergy list. Please review each documented allergy for additional  clarification and justification.    Allergies   Allergen Reactions    Azithromycin Unknown    Lasix [Furosemide] Unknown    Sulfa (Sulfonamide Antibiotics) Unknown          01/13/25 at 8:06 AM - Yesenia Irene

## 2025-01-13 NOTE — PROGRESS NOTES
01/13/25 0915   [facility] Disability Status   Are you deaf or do you have serious difficulty hearing? N   Are you blind or do you have serious difficulty seeing, even when wearing glasses? N   Because of a physical, mental, or emotional condition, do you have serious difficulty concentrating, remembering, or making decisions? (5 years old or older) Y  (dementia)   Do you have serious difficulty walking or climbing stairs? Y  (falls)   Do you have serious difficulty dressing or bathing? Y   Because of a physical, mental, or emotional condition, do you have serious difficulty doing errands alone such as visiting the doctor? Y

## 2025-01-13 NOTE — ED TRIAGE NOTES
Pt came in by EMS after found on the ground at nursing home. Pt does no remember the fall or how it happened. Pt had a bloody nose and bit her lip. Pt has a laceration on her 2 middle fingers. Pt is A&O to herself. Pt denies any headache, cp, numbness and tingling. Pt is complaining of 1/10 pain on finger, other than that no complaints at this time. No thinners  
no suicidal ideation/no depression/no anxiety

## 2025-01-14 ENCOUNTER — APPOINTMENT (OUTPATIENT)
Dept: CARDIOLOGY | Facility: HOSPITAL | Age: 85
End: 2025-01-14
Payer: MEDICAID

## 2025-01-14 LAB
ANION GAP SERPL CALC-SCNC: 9 MMOL/L (ref 10–20)
AORTIC VALVE MEAN GRADIENT: 6 MMHG
AORTIC VALVE PEAK VELOCITY: 1.72 M/S
AV PEAK GRADIENT: 12 MMHG
AVA (PEAK VEL): 2.47 CM2
AVA (VTI): 2.15 CM2
BUN SERPL-MCNC: 16 MG/DL (ref 6–23)
CALCIUM SERPL-MCNC: 8.5 MG/DL (ref 8.6–10.3)
CHLORIDE SERPL-SCNC: 107 MMOL/L (ref 98–107)
CO2 SERPL-SCNC: 30 MMOL/L (ref 21–32)
CREAT SERPL-MCNC: 1.08 MG/DL (ref 0.5–1.05)
EGFRCR SERPLBLD CKD-EPI 2021: 50 ML/MIN/1.73M*2
EJECTION FRACTION APICAL 4 CHAMBER: 65.2
EJECTION FRACTION: 71 %
ERYTHROCYTE [DISTWIDTH] IN BLOOD BY AUTOMATED COUNT: 12.4 % (ref 11.5–14.5)
GLUCOSE SERPL-MCNC: 78 MG/DL (ref 74–99)
HCT VFR BLD AUTO: 34.7 % (ref 36–46)
HGB BLD-MCNC: 11 G/DL (ref 12–16)
LEFT ATRIUM VOLUME AREA LENGTH INDEX BSA: 31.5 ML/M2
LEFT VENTRICLE INTERNAL DIMENSION DIASTOLE: 4.9 CM (ref 3.5–6)
LEFT VENTRICULAR OUTFLOW TRACT DIAMETER: 1.95 CM
MCH RBC QN AUTO: 30.6 PG (ref 26–34)
MCHC RBC AUTO-ENTMCNC: 31.7 G/DL (ref 32–36)
MCV RBC AUTO: 97 FL (ref 80–100)
MITRAL VALVE E/A RATIO: 0.82
NRBC BLD-RTO: 0 /100 WBCS (ref 0–0)
PLATELET # BLD AUTO: 263 X10*3/UL (ref 150–450)
POTASSIUM SERPL-SCNC: 3.5 MMOL/L (ref 3.5–5.3)
RBC # BLD AUTO: 3.59 X10*6/UL (ref 4–5.2)
RIGHT VENTRICLE FREE WALL PEAK S': 15.61 CM/S
RIGHT VENTRICLE PEAK SYSTOLIC PRESSURE: 27.6 MMHG
SODIUM SERPL-SCNC: 142 MMOL/L (ref 136–145)
TRICUSPID ANNULAR PLANE SYSTOLIC EXCURSION: 3 CM
WBC # BLD AUTO: 7.8 X10*3/UL (ref 4.4–11.3)

## 2025-01-14 PROCEDURE — 93306 TTE W/DOPPLER COMPLETE: CPT | Performed by: STUDENT IN AN ORGANIZED HEALTH CARE EDUCATION/TRAINING PROGRAM

## 2025-01-14 PROCEDURE — 2500000001 HC RX 250 WO HCPCS SELF ADMINISTERED DRUGS (ALT 637 FOR MEDICARE OP): Performed by: NURSE PRACTITIONER

## 2025-01-14 PROCEDURE — 36415 COLL VENOUS BLD VENIPUNCTURE: CPT | Performed by: NURSE PRACTITIONER

## 2025-01-14 PROCEDURE — 2500000005 HC RX 250 GENERAL PHARMACY W/O HCPCS: Performed by: NURSE PRACTITIONER

## 2025-01-14 PROCEDURE — 93306 TTE W/DOPPLER COMPLETE: CPT

## 2025-01-14 PROCEDURE — 80048 BASIC METABOLIC PNL TOTAL CA: CPT | Performed by: NURSE PRACTITIONER

## 2025-01-14 PROCEDURE — 2500000004 HC RX 250 GENERAL PHARMACY W/ HCPCS (ALT 636 FOR OP/ED): Performed by: NURSE PRACTITIONER

## 2025-01-14 PROCEDURE — 85027 COMPLETE CBC AUTOMATED: CPT | Performed by: NURSE PRACTITIONER

## 2025-01-14 PROCEDURE — 97161 PT EVAL LOW COMPLEX 20 MIN: CPT | Mod: GP

## 2025-01-14 PROCEDURE — 1200000002 HC GENERAL ROOM WITH TELEMETRY DAILY

## 2025-01-14 PROCEDURE — 97165 OT EVAL LOW COMPLEX 30 MIN: CPT | Mod: GO

## 2025-01-14 RX ORDER — HYDROXYZINE HYDROCHLORIDE 10 MG/1
10 TABLET, FILM COATED ORAL EVERY 8 HOURS PRN
Status: DISCONTINUED | OUTPATIENT
Start: 2025-01-14 | End: 2025-01-15 | Stop reason: HOSPADM

## 2025-01-14 RX ADMIN — POLYETHYLENE GLYCOL 3350 17 G: 17 POWDER, FOR SOLUTION ORAL at 08:35

## 2025-01-14 RX ADMIN — Medication 2000 UNITS: at 08:35

## 2025-01-14 RX ADMIN — PANTOPRAZOLE SODIUM 40 MG: 40 TABLET, DELAYED RELEASE ORAL at 08:35

## 2025-01-14 RX ADMIN — HYDROXYZINE HYDROCHLORIDE 10 MG: 10 TABLET ORAL at 13:38

## 2025-01-14 RX ADMIN — ENOXAPARIN SODIUM 40 MG: 40 INJECTION SUBCUTANEOUS at 20:20

## 2025-01-14 RX ADMIN — ARIPIPRAZOLE 2 MG: 2 TABLET ORAL at 08:35

## 2025-01-14 RX ADMIN — Medication 3 MG: at 20:19

## 2025-01-14 RX ADMIN — Medication 1000 MCG: at 08:35

## 2025-01-14 SDOH — SOCIAL STABILITY: SOCIAL INSECURITY: HAVE YOU HAD ANY THOUGHTS OF HARMING ANYONE ELSE?: NO

## 2025-01-14 SDOH — SOCIAL STABILITY: SOCIAL INSECURITY
WITHIN THE LAST YEAR, HAVE YOU BEEN RAPED OR FORCED TO HAVE ANY KIND OF SEXUAL ACTIVITY BY YOUR PARTNER OR EX-PARTNER?: NO

## 2025-01-14 SDOH — SOCIAL STABILITY: SOCIAL INSECURITY: WITHIN THE LAST YEAR, HAVE YOU BEEN AFRAID OF YOUR PARTNER OR EX-PARTNER?: NO

## 2025-01-14 SDOH — SOCIAL STABILITY: SOCIAL INSECURITY: DOES ANYONE TRY TO KEEP YOU FROM HAVING/CONTACTING OTHER FRIENDS OR DOING THINGS OUTSIDE YOUR HOME?: NO

## 2025-01-14 SDOH — SOCIAL STABILITY: SOCIAL INSECURITY: DO YOU FEEL UNSAFE GOING BACK TO THE PLACE WHERE YOU ARE LIVING?: NO

## 2025-01-14 SDOH — ECONOMIC STABILITY: FOOD INSECURITY: WITHIN THE PAST 12 MONTHS, YOU WORRIED THAT YOUR FOOD WOULD RUN OUT BEFORE YOU GOT THE MONEY TO BUY MORE.: NEVER TRUE

## 2025-01-14 SDOH — ECONOMIC STABILITY: FOOD INSECURITY: HOW HARD IS IT FOR YOU TO PAY FOR THE VERY BASICS LIKE FOOD, HOUSING, MEDICAL CARE, AND HEATING?: NOT VERY HARD

## 2025-01-14 SDOH — ECONOMIC STABILITY: INCOME INSECURITY: IN THE PAST 12 MONTHS HAS THE ELECTRIC, GAS, OIL, OR WATER COMPANY THREATENED TO SHUT OFF SERVICES IN YOUR HOME?: NO

## 2025-01-14 SDOH — ECONOMIC STABILITY: HOUSING INSECURITY: IN THE LAST 12 MONTHS, WAS THERE A TIME WHEN YOU WERE NOT ABLE TO PAY THE MORTGAGE OR RENT ON TIME?: NO

## 2025-01-14 SDOH — SOCIAL STABILITY: SOCIAL INSECURITY
WITHIN THE LAST YEAR, HAVE YOU BEEN KICKED, HIT, SLAPPED, OR OTHERWISE PHYSICALLY HURT BY YOUR PARTNER OR EX-PARTNER?: NO

## 2025-01-14 SDOH — HEALTH STABILITY: PHYSICAL HEALTH
HOW OFTEN DO YOU NEED TO HAVE SOMEONE HELP YOU WHEN YOU READ INSTRUCTIONS, PAMPHLETS, OR OTHER WRITTEN MATERIAL FROM YOUR DOCTOR OR PHARMACY?: NEVER

## 2025-01-14 SDOH — ECONOMIC STABILITY: HOUSING INSECURITY: AT ANY TIME IN THE PAST 12 MONTHS, WERE YOU HOMELESS OR LIVING IN A SHELTER (INCLUDING NOW)?: NO

## 2025-01-14 SDOH — SOCIAL STABILITY: SOCIAL INSECURITY: WERE YOU ABLE TO COMPLETE ALL THE BEHAVIORAL HEALTH SCREENINGS?: NO

## 2025-01-14 SDOH — SOCIAL STABILITY: SOCIAL INSECURITY: WITHIN THE LAST YEAR, HAVE YOU BEEN HUMILIATED OR EMOTIONALLY ABUSED IN OTHER WAYS BY YOUR PARTNER OR EX-PARTNER?: NO

## 2025-01-14 SDOH — ECONOMIC STABILITY: FOOD INSECURITY: WITHIN THE PAST 12 MONTHS, THE FOOD YOU BOUGHT JUST DIDN'T LAST AND YOU DIDN'T HAVE MONEY TO GET MORE.: NEVER TRUE

## 2025-01-14 SDOH — SOCIAL STABILITY: SOCIAL INSECURITY: ARE THERE ANY APPARENT SIGNS OF INJURIES/BEHAVIORS THAT COULD BE RELATED TO ABUSE/NEGLECT?: NO

## 2025-01-14 SDOH — SOCIAL STABILITY: SOCIAL INSECURITY: DO YOU FEEL ANYONE HAS EXPLOITED OR TAKEN ADVANTAGE OF YOU FINANCIALLY OR OF YOUR PERSONAL PROPERTY?: NO

## 2025-01-14 SDOH — ECONOMIC STABILITY: TRANSPORTATION INSECURITY: IN THE PAST 12 MONTHS, HAS LACK OF TRANSPORTATION KEPT YOU FROM MEDICAL APPOINTMENTS OR FROM GETTING MEDICATIONS?: NO

## 2025-01-14 SDOH — ECONOMIC STABILITY: HOUSING INSECURITY: IN THE PAST 12 MONTHS, HOW MANY TIMES HAVE YOU MOVED WHERE YOU WERE LIVING?: 1

## 2025-01-14 SDOH — SOCIAL STABILITY: SOCIAL INSECURITY: HAVE YOU HAD THOUGHTS OF HARMING ANYONE ELSE?: YES

## 2025-01-14 SDOH — SOCIAL STABILITY: SOCIAL INSECURITY: ARE YOU OR HAVE YOU BEEN THREATENED OR ABUSED PHYSICALLY, EMOTIONALLY, OR SEXUALLY BY ANYONE?: NO

## 2025-01-14 SDOH — SOCIAL STABILITY: SOCIAL INSECURITY: HAS ANYONE EVER THREATENED TO HURT YOUR FAMILY OR YOUR PETS?: NO

## 2025-01-14 SDOH — SOCIAL STABILITY: SOCIAL INSECURITY: ABUSE: ADULT

## 2025-01-14 ASSESSMENT — ACTIVITIES OF DAILY LIVING (ADL)
ASSISTIVE_DEVICE: NONE;WALKER
HEARING - LEFT EAR: FUNCTIONAL
PATIENT'S MEMORY ADEQUATE TO SAFELY COMPLETE DAILY ACTIVITIES?: NO
DRESSING YOURSELF: NEEDS ASSISTANCE
BATHING_ASSISTANCE: STAND BY
FEEDING YOURSELF: NEEDS ASSISTANCE
BATHING: NEEDS ASSISTANCE
LACK_OF_TRANSPORTATION: NO
WALKS IN HOME: NEEDS ASSISTANCE
TOILETING: NEEDS ASSISTANCE
ADL_ASSISTANCE: INDEPENDENT
ADEQUATE_TO_COMPLETE_ADL: YES
GROOMING: NEEDS ASSISTANCE
JUDGMENT_ADEQUATE_SAFELY_COMPLETE_DAILY_ACTIVITIES: NO
HEARING - RIGHT EAR: FUNCTIONAL
DRESSING_ASSISTANCE: STAND BY
BATHING_ASSISTANCE: STAND BY

## 2025-01-14 ASSESSMENT — COGNITIVE AND FUNCTIONAL STATUS - GENERAL
TOILETING: A LITTLE
CLIMB 3 TO 5 STEPS WITH RAILING: A LOT
HELP NEEDED FOR BATHING: A LITTLE
MOBILITY SCORE: 17
MOVING TO AND FROM BED TO CHAIR: A LITTLE
HELP NEEDED FOR BATHING: A LITTLE
DRESSING REGULAR UPPER BODY CLOTHING: A LITTLE
MOVING TO AND FROM BED TO CHAIR: A LITTLE
PERSONAL GROOMING: A LITTLE
STANDING UP FROM CHAIR USING ARMS: A LITTLE
TOILETING: A LOT
DRESSING REGULAR LOWER BODY CLOTHING: A LITTLE
TURNING FROM BACK TO SIDE WHILE IN FLAT BAD: A LITTLE
DRESSING REGULAR UPPER BODY CLOTHING: A LITTLE
DAILY ACTIVITIY SCORE: 18
DRESSING REGULAR LOWER BODY CLOTHING: A LITTLE
WALKING IN HOSPITAL ROOM: A LITTLE
WALKING IN HOSPITAL ROOM: A LITTLE
MOBILITY SCORE: 17
CLIMB 3 TO 5 STEPS WITH RAILING: A LOT
TURNING FROM BACK TO SIDE WHILE IN FLAT BAD: A LITTLE
DAILY ACTIVITIY SCORE: 19
STANDING UP FROM CHAIR USING ARMS: A LITTLE
MOVING FROM LYING ON BACK TO SITTING ON SIDE OF FLAT BED WITH BEDRAILS: A LITTLE
PERSONAL GROOMING: A LITTLE
MOVING FROM LYING ON BACK TO SITTING ON SIDE OF FLAT BED WITH BEDRAILS: A LITTLE

## 2025-01-14 ASSESSMENT — PATIENT HEALTH QUESTIONNAIRE - PHQ9
2. FEELING DOWN, DEPRESSED OR HOPELESS: NOT AT ALL
SUM OF ALL RESPONSES TO PHQ9 QUESTIONS 1 & 2: 0
1. LITTLE INTEREST OR PLEASURE IN DOING THINGS: NOT AT ALL

## 2025-01-14 ASSESSMENT — LIFESTYLE VARIABLES
HOW OFTEN DO YOU HAVE A DRINK CONTAINING ALCOHOL: NEVER
HOW MANY STANDARD DRINKS CONTAINING ALCOHOL DO YOU HAVE ON A TYPICAL DAY: PATIENT DOES NOT DRINK
HOW OFTEN DO YOU HAVE 6 OR MORE DRINKS ON ONE OCCASION: NEVER
SKIP TO QUESTIONS 9-10: 1
AUDIT-C TOTAL SCORE: 0
AUDIT-C TOTAL SCORE: 0

## 2025-01-14 ASSESSMENT — PAIN SCALES - GENERAL
PAINLEVEL_OUTOF10: 0 - NO PAIN

## 2025-01-14 ASSESSMENT — PAIN - FUNCTIONAL ASSESSMENT
PAIN_FUNCTIONAL_ASSESSMENT: 0-10
PAIN_FUNCTIONAL_ASSESSMENT: 0-10

## 2025-01-14 NOTE — PROGRESS NOTES
INPATIENT PROGRESS NOTES    PRIMARY SERVICE: Hernesto Palmer MD   1/14/2025  8:44 AM    INTERVAL HPI: Pt feels OK,     PERTINENT ROS:  REVIEW OF SYSTEMS  GENERAL: negative for fever, SEE HPI  RESPIRATORY: Negative for cough, wheezing or shortness of breath.  CARDIOVASCULAR: Negative for chest pain, leg swelling or palpitations.  GI: Negative for abdominal discomfort, blood in stools or black stools or change in bowel habits  NEURO: no change per nursing  All other reviewed and negative other than HPI.      MEDICATIONS:  Scheduled medications  ARIPiprazole, 2 mg, oral, Daily  cholecalciferol, 2,000 Units, oral, Daily  cyanocobalamin, 1,000 mcg, oral, Daily  enoxaparin, 40 mg, subcutaneous, q24h  estradiol, 1 g, vaginal, Once per day on Monday Thursday  melatonin, 3 mg, oral, Nightly  pantoprazole, 40 mg, oral, Daily before breakfast  polyethylene glycol, 17 g, oral, Daily      Continuous medications     PRN medications  PRN medications: ondansetron **OR** ondansetron      PHYSICAL EXAM:       1/13/2025     3:00 PM 1/13/2025     4:30 PM 1/13/2025     5:30 PM 1/13/2025     6:42 PM 1/13/2025     9:33 PM 1/14/2025     5:29 AM 1/14/2025     7:32 AM   Vitals   Systolic 131 128 122 123 152 148 149   Diastolic 87 87 71 87 68 70 67   BP Location Left arm Left arm Left arm Left arm Left arm Left arm Right arm   Heart Rate 72 68 71 71 75 88 67   Temp     37 °C (98.6 °F) 36.2 °C (97.2 °F) 36.3 °C (97.3 °F)   Resp 19 16 15 18 17 18 17          PHYSICAL EXAMINATION:    General appearance: well-hydrated, well nourished  Skin: skin color, texture, turgor normal,   HEENT: Anicteric sclera.  Oropharynx mucosa moist  Neck: Supple, no adenopathy;   Back: no pain to palpation over spine or costovertebral angles,   Lungs: clear to auscultation, no wheezing or rhonchi  Heart: RRR without murmur, gallop, or rubs.   Abdomen: Abdomen soft, non-tender. Bowel sounds normal. No masses, organomegaly  Extremities: Extremities normal. No  edema, or  skin discoloration.   Musculoskeletal: Spine range of motion normal. Muscular strength intact  Neuro: Oriented X  0  Left finger no sign of infection, sutruers in place    DATA: CBC, Coags, BMP, Mg, Phos   Scheduled medications  ARIPiprazole, 2 mg, oral, Daily  cholecalciferol, 2,000 Units, oral, Daily  cyanocobalamin, 1,000 mcg, oral, Daily  enoxaparin, 40 mg, subcutaneous, q24h  estradiol, 1 g, vaginal, Once per day on Monday Thursday  melatonin, 3 mg, oral, Nightly  pantoprazole, 40 mg, oral, Daily before breakfast  polyethylene glycol, 17 g, oral, Daily      Continuous medications     PRN medications  PRN medications: ondansetron **OR** ondansetron  Results for orders placed or performed during the hospital encounter of 01/12/25 (from the past 96 hours)   CBC and Auto Differential   Result Value Ref Range    WBC 6.9 4.4 - 11.3 x10*3/uL    nRBC 0.0 0.0 - 0.0 /100 WBCs    RBC 4.04 4.00 - 5.20 x10*6/uL    Hemoglobin 12.4 12.0 - 16.0 g/dL    Hematocrit 38.5 36.0 - 46.0 %    MCV 95 80 - 100 fL    MCH 30.7 26.0 - 34.0 pg    MCHC 32.2 32.0 - 36.0 g/dL    RDW 12.5 11.5 - 14.5 %    Platelets 290 150 - 450 x10*3/uL    Neutrophils % 63.2 40.0 - 80.0 %    Immature Granulocytes %, Automated 0.3 0.0 - 0.9 %    Lymphocytes % 23.3 13.0 - 44.0 %    Monocytes % 10.6 2.0 - 10.0 %    Eosinophils % 2.3 0.0 - 6.0 %    Basophils % 0.3 0.0 - 2.0 %    Neutrophils Absolute 4.34 1.60 - 5.50 x10*3/uL    Immature Granulocytes Absolute, Automated 0.02 0.00 - 0.50 x10*3/uL    Lymphocytes Absolute 1.60 0.80 - 3.00 x10*3/uL    Monocytes Absolute 0.73 0.05 - 0.80 x10*3/uL    Eosinophils Absolute 0.16 0.00 - 0.40 x10*3/uL    Basophils Absolute 0.02 0.00 - 0.10 x10*3/uL   Comprehensive metabolic panel   Result Value Ref Range    Glucose 107 (H) 74 - 99 mg/dL    Sodium 138 136 - 145 mmol/L    Potassium 4.1 3.5 - 5.3 mmol/L    Chloride 104 98 - 107 mmol/L    Bicarbonate 27 21 - 32 mmol/L    Anion Gap 11 10 - 20 mmol/L    Urea Nitrogen 23 6 - 23  mg/dL    Creatinine 1.17 (H) 0.50 - 1.05 mg/dL    eGFR 46 (L) >60 mL/min/1.73m*2    Calcium 9.1 8.6 - 10.3 mg/dL    Albumin 3.8 3.4 - 5.0 g/dL    Alkaline Phosphatase 89 33 - 136 U/L    Total Protein 6.0 (L) 6.4 - 8.2 g/dL    AST 12 9 - 39 U/L    Bilirubin, Total 0.3 0.0 - 1.2 mg/dL    ALT 8 7 - 45 U/L   Troponin I, High Sensitivity, Initial   Result Value Ref Range    Troponin I, High Sensitivity 3 0 - 13 ng/L   B-type Natriuretic Peptide   Result Value Ref Range    BNP 16 0 - 99 pg/mL   ECG 12 lead   Result Value Ref Range    Ventricular Rate 88 BPM    Atrial Rate 88 BPM    UT Interval 176 ms    QRS Duration 136 ms    QT Interval 388 ms    QTC Calculation(Bazett) 469 ms    P Axis 66 degrees    R Axis 79 degrees    T Axis 38 degrees    QRS Count 14 beats    Q Onset 223 ms    P Onset 135 ms    P Offset 200 ms    T Offset 417 ms    QTC Fredericia 441 ms   Troponin, High Sensitivity, 1 Hour   Result Value Ref Range    Troponin I, High Sensitivity 11 0 - 13 ng/L   ECG 12 lead   Result Value Ref Range    Ventricular Rate 82 BPM    Atrial Rate 82 BPM    UT Interval 170 ms    QRS Duration 144 ms    QT Interval 422 ms    QTC Calculation(Bazett) 493 ms    P Axis 73 degrees    R Axis 87 degrees    T Axis 23 degrees    QRS Count 13 beats    Q Onset 223 ms    P Onset 138 ms    P Offset 195 ms    T Offset 434 ms    QTC Fredericia 468 ms   Urinalysis with Reflex Culture and Microscopic   Result Value Ref Range    Color, Urine Light-Yellow Light-Yellow, Yellow, Dark-Yellow    Appearance, Urine Clear Clear    Specific Gravity, Urine 1.011 1.005 - 1.035    pH, Urine 5.5 5.0, 5.5, 6.0, 6.5, 7.0, 7.5, 8.0    Protein, Urine NEGATIVE NEGATIVE, 10 (TRACE), 20 (TRACE) mg/dL    Glucose, Urine Normal Normal mg/dL    Blood, Urine NEGATIVE NEGATIVE    Ketones, Urine NEGATIVE NEGATIVE mg/dL    Bilirubin, Urine NEGATIVE NEGATIVE    Urobilinogen, Urine Normal Normal mg/dL    Nitrite, Urine NEGATIVE NEGATIVE    Leukocyte Esterase, Urine NEGATIVE  NEGATIVE   Sars-CoV-2 and Influenza A/B PCR   Result Value Ref Range    Flu A Result Not Detected Not Detected    Flu B Result Not Detected Not Detected    Coronavirus 2019, PCR Not Detected Not Detected   Basic metabolic panel   Result Value Ref Range    Glucose 78 74 - 99 mg/dL    Sodium 142 136 - 145 mmol/L    Potassium 3.5 3.5 - 5.3 mmol/L    Chloride 107 98 - 107 mmol/L    Bicarbonate 30 21 - 32 mmol/L    Anion Gap 9 (L) 10 - 20 mmol/L    Urea Nitrogen 16 6 - 23 mg/dL    Creatinine 1.08 (H) 0.50 - 1.05 mg/dL    eGFR 50 (L) >60 mL/min/1.73m*2    Calcium 8.5 (L) 8.6 - 10.3 mg/dL   CBC   Result Value Ref Range    WBC 7.8 4.4 - 11.3 x10*3/uL    nRBC 0.0 0.0 - 0.0 /100 WBCs    RBC 3.59 (L) 4.00 - 5.20 x10*6/uL    Hemoglobin 11.0 (L) 12.0 - 16.0 g/dL    Hematocrit 34.7 (L) 36.0 - 46.0 %    MCV 97 80 - 100 fL    MCH 30.6 26.0 - 34.0 pg    MCHC 31.7 (L) 32.0 - 36.0 g/dL    RDW 12.4 11.5 - 14.5 %    Platelets 263 150 - 450 x10*3/uL           ASSESSMENT AND PLAN:     Assessment & Plan  Fall, initial encounter  Bradycardia  Dementia  Syncope ?  Echo pending   Will plan on dc to [facility] if echo is ok  Plan of care discussed with: Provider, RN, Patient.    Hernesto Palmer MD     Office: [phone]

## 2025-01-14 NOTE — PROGRESS NOTES
Physical Therapy    Physical Therapy Evaluation    Patient Name: Teresita De La Paz  MRN: [id number]  Department: [facility] [id number]  Room: [address] [place] [address]  Today's Date: 1/14/2025   Time Calculation  Start Time: 1029  Stop Time: 1043  Time Calculation (min): 14 min    Assessment/Plan   PT Assessment  PT Assessment Results: Decreased strength, Decreased endurance, Impaired balance, Decreased mobility, Decreased cognition  Rehab Prognosis: Good  Barriers to Discharge Home: No anticipated barriers  Evaluation/Treatment Tolerance: Patient tolerated treatment well  Medical Staff Made Aware: Yes  Strengths: Access to adaptive/assistive products, Support of Caregivers  Barriers to Participation: Insight into problems  End of Session Communication: Bedside nurse, PCT/NA/CTA  Assessment Comment: Pt presents today with fair functional BLE strength, decreased balance, and recent fall. Pt would benefit from continued PT to progress gait and balance training to reduce pt's risk of falls. At D/C, pt is anticipate to benefit from low intensity therapy.  End of Session Patient Position: Bed, 3 rail up, Alarm off, not on at start of session (Sitter present)  IP OR SWING BED PT PLAN  Inpatient or Swing Bed: Inpatient  PT Plan  Treatment/Interventions: Bed mobility, Transfer training, Gait training, Balance training, Strengthening, Therapeutic exercise, Therapeutic activity, Postural re-education  PT Plan: Ongoing PT  PT Frequency: 3 times per week  PT Discharge Recommendations: Low intensity level of continued care  Equipment Recommended upon Discharge: Wheeled walker  PT Recommended Transfer Status: Contact guard, Assistive device  PT - OK to Discharge: Yes (PT POC initiated today)    Subjective   General Visit Information:  General  Reason for Referral: 85 y/o F presenting s/p unwitnessed fall and noted to have bradycardia  Referred By: LEANN Cortez (APRN-CNP)  Past Medical History Relevant to Rehab: Alzheimer's dementia, HTn, GERD, sinus  bradycardia.  Family/Caregiver Present: No  Prior to Session Communication: Bedside nurse  Patient Position Received: Bed, 4 rail up, Alarm off, not on at start of session (Sitter present)  Preferred Learning Style: auditory, kinesthetic, visual  General Comment: Pt supine in bed upon PT arrival. Cleared to participate with RN, and agreeable to PT evlauation.  Home Living:  Home Living  Type of Home: Assisted living  Lives With: Alone  Home Adaptive Equipment: Walker rolling or standard, Cane  Home Living Comments: Information obtained from nurse at home [facility]. Pt a questionable historian.  Prior Level of Function:  Prior Function Per Pt/Caregiver Report  Receives Help From:  (Facility staff)  Bath: Stand by (Per nurse at [facility], pt able to phyiscally bath herself with standby assistance)  Dressing: Stand by  Feeding:  (Independent)  Homemaking Assistance: Needs assistance  Meal Prep:  (Meals provided by facility)  Ambulatory Assistance: Independent  Transfers: Independent  Gait: Independent, Assistive device (With cane per nurse at [facility])  Hand Dominance: Right  Prior Function Comments: Per nurse at [facility], pt has not had any other recent falls other than admitting one  Precautions:  Precautions  Medical Precautions: Fall precautions    Objective   Pain:  Pain Assessment  Pain Assessment: 0-10  0-10 (Numeric) Pain Score: 0 - No pain  Cognition:  Cognition  Overall Cognitive Status: Impaired  Orientation Level: Disoriented to place, Disoriented to time, Disoriented to situation  Insight: Moderate  Impulsive: Within functional limits    General Assessments:  Activity Tolerance  Endurance: Endurance does not limit participation in activity    Sensation  Light Touch: No apparent deficits    Coordination  Movements are Fluid and Coordinated: Yes  Finger to Nose: Intact  Finger to Target: Intact  Coordination Comment: Finger to thumb approximation intact bilaterally    Postural Control  Head Control: Forward head posture  Posture  Comment: Rounded shoulders    Static Sitting Balance  Static Sitting-Balance Support: Bilateral upper extremity supported, Feet supported  Static Sitting-Level of Assistance: Close supervision  Static Sitting-Comment/Number of Minutes: At EOB  Dynamic Sitting Balance  Dynamic Sitting-Balance Support: Feet supported (Single LE support on bed)  Dynamic Sitting-Level of Assistance: Close supervision  Dynamic Sitting-Balance: Lateral lean, Forward lean  Dynamic Sitting-Comments: Pt able to complete forward lateral (L/R) reaching slighlty outside of NANCY without LOB in EOB sitting.    Static Standing Balance  Static Standing-Balance Support: Bilateral upper extremity supported  Static Standing-Level of Assistance: Contact guard  Static Standing-Comment/Number of Minutes: +Gait belt with FWW support  Dynamic Standing Balance  Dynamic Standing-Balance Support: Bilateral upper extremity supported  Dynamic Standing-Level of Assistance: Contact guard  Dynamic Standing-Balance: Turning  Dynamic Standing-Comments: +Gait belt with FWW support  Functional Assessments:  Bed Mobility  Bed Mobility: Yes  Bed Mobility 1  Bed Mobility 1: Supine to sitting  Level of Assistance 1: Contact guard, Moderate verbal cues  Bed Mobility Comments 1: HOB elevated. Pt able to come to long sitting and progress BLE off the EOB with increased VC. CGA to trunk during BLE scoot off the EOB  Bed Mobility 2  Bed Mobility  2: Sitting to supine  Level of Assistance 2: Distant supervision, Minimal verbal cues  Bed Mobility Comments 2: HOB flat. Pt able to lift BLE into bed with fair trunk control on descent to the bed. VC for BLE and trunk movement for improved body alignment in supine.    Transfers  Transfer: Yes  Transfer 1  Transfer From 1: Bed to  Transfer to 1: Stand  Technique 1: Sit to stand  Transfer Device 1: Walker, Gait belt  Transfer Level of Assistance 1: Contact guard  Trials/Comments 1: Pt demonstrates BUE push from the bed to stand with  slightly increased time. Pt reaching for BUE support in standing. FWW support provided.  Transfers 2  Transfer From 2: Stand to  Transfer to 2: Bed  Technique 2: Stand to sit  Transfer Device 2: Walker, Gait belt  Transfer Level of Assistance 2: Contact guard, Minimal verbal cues, Minimal tactile cues  Trials/Comments 2: VC for BLE positioning against the bed before attempting to sit. VC/TC for BUE reach for the bed for controlled descent.    Ambulation/Gait Training  Ambulation/Gait Training Performed: Yes  Ambulation/Gait Training 1  Surface 1: Level tile  Device 1: Rolling walker  Gait Support Devices: Gait belt  Assistance 1: Contact guard, Minimum assistance, Minimal verbal cues  Quality of Gait 1: Narrow base of support, Forward flexed posture, Decreased step length  Comments/Distance (ft) 1: About 40 ft with a step-through pattern. Pt demonstrates decreased rachelle and step length. Pt also demonstrates flexed posture at trunk and head/neck with gaze directed toward the ground. VC provided for directional safety and to intiate turns with the FWW. Mild incoordination observed for 4 feet of trial with foot placement on the ground.    Stairs  Stairs: No  Extremity/Trunk Assessments:  RLE   RLE : Exceptions to WFL  Strength RLE  R Hip Flexion: 4/5  R Knee Extension: 4/5  R Ankle Dorsiflexion: 3+/5  R Ankle Plantar Flexion: 3+/5  LLE   LLE : Exceptions to WFL  Strength LLE  L Hip Flexion: 4/5  L Knee Extension: 4/5  L Ankle Dorsiflexion: 3+/5  L Ankle Plantar Flexion: 3+/5  Outcome Measures:  [facility] Basic Mobility  Turning from your back to your side while in a flat bed without using bedrails: A little  Moving from lying on your back to sitting on the side of a flat bed without using bedrails: A little  Moving to and from bed to chair (including a wheelchair): A little  Standing up from a chair using your arms (e.g. wheelchair or bedside chair): A little  To walk in hospital room: A little  Climbing 3-5 steps with  railing: A lot  Basic Mobility - Total Score: 17    Encounter Problems       Encounter Problems (Active)       Balance       Goal 1 (Progressing)       Start:  01/14/25    Expected End:  01/28/25       Pt performs all sitting balance IND and standing balance with supervision using LRAD            Mobility       STG - Patient will ambulate (Progressing)       Start:  01/14/25    Expected End:  01/28/25       >100 ft using LRAD without LOB and with proper gait mechanics            PT Transfers       STG - Patient to transfer to and from sit to supine (Progressing)       Start:  01/14/25    Expected End:  01/28/25       IND from a flat bed         STG - Patient will transfer sit to and from stand (Progressing)       Start:  01/14/25    Expected End:  01/28/25       With supervision using LRAD with proper body mechanics              Education Documentation  Precautions, taught by Gideon Morales PT at 1/14/2025 11:12 AM.  Learner: Patient  Readiness: Acceptance  Method: Explanation, Demonstration  Response: Demonstrated Understanding, Needs Reinforcement    Body Mechanics, taught by Gideon Morales PT at 1/14/2025 11:12 AM.  Learner: Patient  Readiness: Acceptance  Method: Explanation, Demonstration  Response: Demonstrated Understanding, Needs Reinforcement    Mobility Training, taught by Gideon Morales PT at 1/14/2025 11:12 AM.  Learner: Patient  Readiness: Acceptance  Method: Explanation, Demonstration  Response: Demonstrated Understanding, Needs Reinforcement

## 2025-01-14 NOTE — CARE PLAN
The patient's goals for the shift include     The clinical goals for the shift include Remain free from falls and injuries    Over the shift, the patient did not make progress toward the following goals. Barriers to progression include  Recommendations to address these barriers include      Problem: Safety - Adult  Goal: Free from fall injury  Outcome: Progressing     Problem: Chronic Conditions and Co-morbidities  Goal: Patient's chronic conditions and co-morbidity symptoms are monitored and maintained or improved  Outcome: Progressing     Problem: Fall/Injury  Goal: Not fall by end of shift  Outcome: Progressing     Problem: Fall/Injury  Goal: Verbalize understanding of personal risk factors for fall in the hospital  Outcome: Progressing     Problem: Discharge Planning  Goal: Discharge to home or other facility with appropriate resources  Outcome: Progressing

## 2025-01-14 NOTE — PROGRESS NOTES
Occupational Therapy    Evaluation    Patient Name: Teresita De La Paz  MRN: [id number]  Department: [facility]   Room: [address] [place] [address]  Today's Date: 1/14/2025  Time Calculation  Start Time: 1149  Stop Time: 1200  Time Calculation (min): 11 min    Assessment  IP OT Assessment  OT Assessment:  (OT Eval complete, patient with decreased ADLs, decreased transfers, decreased activity tolerance. LOW intensity therapy to maximize functional independence.)  Prognosis: Good  Barriers to Discharge Home: Cognition needs  Cognition Needs: 24hr supervision for safety awareness needed  Evaluation/Treatment Tolerance: Patient tolerated treatment well  Medical Staff Made Aware: Yes  End of Session Communication: Bedside nurse  End of Session Patient Position: Bed, 3 rail up, Alarm on  Plan:  Treatment Interventions: ADL retraining, Functional transfer training, Endurance training, Cognitive reorientation, Neuromuscular reeducation  OT Frequency: 2 times per week  OT Discharge Recommendations: Low intensity level of continued care  Equipment Recommended upon Discharge: Wheeled walker  OT Recommended Transfer Status: Stand by assist  OT - OK to Discharge: Yes    Subjective   Current Problem:  1. Fall, initial encounter        2. Benign essential hypertension  Transthoracic Echo (TTE) Complete    Transthoracic Echo (TTE) Complete      3. Hypoxia        4. Sinus bradycardia  Transthoracic Echo (TTE) Complete    Transthoracic Echo (TTE) Complete        General:  General  Reason for Referral: 83 y/o F presenting s/p unwitnessed fall and noted to have bradycardia  Referred By: LEANN Cortez (APRN-CNP)  Past Medical History Relevant to Rehab: Alzheimer's dementia, HTn, GERD, sinus bradycardia.  Family/Caregiver Present: Yes  Caregiver Feedback:  (Dtr present and receptive of therapy)  Prior to Session Communication: Bedside nurse  Patient Position Received: Bed, 3 rail up, Alarm on  General Comment:  (Patient is pleasantly confused.)  Precautions:  Medical  Precautions: Fall precautions      Pain:  Pain Assessment  Pain Assessment: 0-10  0-10 (Numeric) Pain Score: 0 - No pain    Objective   Cognition:  Overall Cognitive Status: Impaired  Orientation Level: Disoriented to place, Disoriented to time, Disoriented to situation  Insight: Moderate  Impulsive: Moderately     Home Living:  Type of Home:  ([facility], no steps, bed/bath on same level, walk in shower, std ht toilet.)  Lives With: Alone  Home Adaptive Equipment:  (Rolling Walker, raised toilet seat, grab bars, shower seat.)   Prior Function:  Level of [place]: Independent with ADLs and functional transfers  Receives Help From:  (AL Staff)  ADL Assistance: Independent  Ambulatory Assistance: Independent (Wheeled walker.)  Transfers: Independent  Hand Dominance: Right  IADL History:  Homemaking Responsibilities:  (Meals provided)  ADL:  Eating Assistance: Independent  Grooming Assistance: Stand by  Bathing Assistance: Stand by  UE Dressing Assistance: Stand by  LE Dressing Assistance: Stand by  Toileting Assistance with Device: Stand by  Functional Assistance: Stand by  Activity Tolerance:  Endurance: Endurance does not limit participation in activity  Bed Mobility/Transfers: Bed Mobility  Bed Mobility: Yes  Bed Mobility 1  Bed Mobility 1: Supine to sitting  Level of Assistance 1: Close supervision  Bed Mobility 2  Bed Mobility  2: Sitting to supine  Level of Assistance 2: Distant supervision    Transfers  Transfer: Yes  Transfer 1  Transfer From 1: Bed to  Transfer to 1: Toilet  Technique 1: Sit to stand  Transfer Device 1: Walker  Transfer Level of Assistance 1: Contact guard  Trials/Comments 1:  (Cues for hand placement)  Transfers 2  Transfer From 2: Toilet to  Transfer to 2: Bed  Technique 2: Sit to stand  Transfer Device 2: Walker  Transfer Level of Assistance 2: Contact guard  Trials/Comments 2:  (Cues for hand placement)    Sitting Balance:  Static Sitting Balance  Static  Sitting-Balance Support: Feet supported  Static Sitting-Level of Assistance: Close supervision  Dynamic Sitting Balance  Dynamic Sitting-Balance Support: Feet supported  Dynamic Sitting-Level of Assistance: Close supervision  Dynamic Sitting-Balance: Forward lean  Standing Balance:  Static Standing Balance  Static Standing-Balance Support: Bilateral upper extremity supported  Static Standing-Level of Assistance: Contact guard  Dynamic Standing Balance  Dynamic Standing-Balance Support: Bilateral upper extremity supported  Dynamic Standing-Level of Assistance: Contact guard  Dynamic Standing-Balance: Forward lean, Reaching for objects    IADL's:   Homemaking Responsibilities:  (Meals provided)  Vision: Vision - Basic Assessment  Current Vision: Wears glasses all the time  Sensation:  Light Touch: No apparent deficits  Strength:  Strength Comments:  (B UEs- 4/5 throughout)    Coordination:  Movements are Fluid and Coordinated: Yes   Hand Function:  Hand Function  Gross Grasp: Functional  Coordination: Functional    Outcome Measures: [facility] Daily Activity  Putting on and taking off regular lower body clothing: A little  Bathing (including washing, rinsing, drying): A little  Putting on and taking off regular upper body clothing: A little  Toileting, which includes using toilet, bedpan or urinal: A little  Taking care of personal grooming such as brushing teeth: A little  Eating Meals: None  Daily Activity - Total Score: 19      Goals:   Encounter Problems       Encounter Problems (Active)       ADLs       Patient will perform UB and LB bathing with independent level of assistance.        Start:  01/14/25    Expected End:  01/28/25            Patient with complete upper body dressing with independent level of assistance donning and doffing all UE clothes with no adaptive equipment while edge of bed        Start:  01/14/25    Expected End:  01/28/25            Patient with complete lower body dressing with independent  level of assistance donning and doffing all LE clothes  with no adaptive equipment while edge of bed        Start:  01/14/25    Expected End:  01/28/25            Patient will complete daily grooming tasks brushing teeth and washing face/hair with independent level of assistance and PRN adaptive equipment while standing.       Start:  01/14/25    Expected End:  01/28/25            Patient will complete toileting including hygiene clothing management/hygiene with independent level of assistance and raised toilet seat.       Start:  01/14/25    Expected End:  01/28/25               BALANCE       Pt will maintain dynamic standing balance during ADL task with independent level of assistance in order to demonstrate decreased risk of falling and improved postural control.       Start:  01/14/25    Expected End:  01/28/25               COGNITION/SAFETY       Patient to perform functional transfers with min verbal cues for hand placement and verbalize orientation with min verbal cues.        Start:  01/14/25    Expected End:  01/28/25               TRANSFERS       Patient will perform bed mobility independent level of assistance and bed rails in order to improve safety and independence with mobility       Start:  01/14/25    Expected End:  01/28/25            Patient will complete functional transfer to all surfaces with front wheeled walker with modified independent level of assistance.       Start:  01/14/25    Expected End:  01/28/25

## 2025-01-14 NOTE — PROGRESS NOTES
01/14/25 1343   Discharge Planning   Home or Post Acute Services Post acute facilities (Rehab/SNF/etc)   Type of Post Acute Facility Services Assisted living     I spoke with Ladonna at [facility]. She confirmed no barriers to return. RN just needs to call report [phone].

## 2025-01-15 ENCOUNTER — APPOINTMENT (OUTPATIENT)
Dept: CARDIOLOGY | Facility: HOSPITAL | Age: 85
End: 2025-01-15
Payer: MEDICAID

## 2025-01-15 VITALS
WEIGHT: 150 LBS | OXYGEN SATURATION: 93 % | TEMPERATURE: 97.9 F | HEIGHT: 68 IN | BODY MASS INDEX: 22.73 KG/M2 | DIASTOLIC BLOOD PRESSURE: 60 MMHG | RESPIRATION RATE: 17 BRPM | HEART RATE: 56 BPM | SYSTOLIC BLOOD PRESSURE: 118 MMHG

## 2025-01-15 LAB
ANION GAP SERPL CALC-SCNC: 8 MMOL/L (ref 10–20)
BODY SURFACE AREA: 1.81 M2
BUN SERPL-MCNC: 17 MG/DL (ref 6–23)
CALCIUM SERPL-MCNC: 8.4 MG/DL (ref 8.6–10.3)
CHLORIDE SERPL-SCNC: 109 MMOL/L (ref 98–107)
CO2 SERPL-SCNC: 29 MMOL/L (ref 21–32)
CREAT SERPL-MCNC: 1.04 MG/DL (ref 0.5–1.05)
EGFRCR SERPLBLD CKD-EPI 2021: 53 ML/MIN/1.73M*2
ERYTHROCYTE [DISTWIDTH] IN BLOOD BY AUTOMATED COUNT: 12.6 % (ref 11.5–14.5)
GLUCOSE SERPL-MCNC: 84 MG/DL (ref 74–99)
HCT VFR BLD AUTO: 32.9 % (ref 36–46)
HGB BLD-MCNC: 10.4 G/DL (ref 12–16)
MCH RBC QN AUTO: 30.8 PG (ref 26–34)
MCHC RBC AUTO-ENTMCNC: 31.6 G/DL (ref 32–36)
MCV RBC AUTO: 97 FL (ref 80–100)
NRBC BLD-RTO: 0 /100 WBCS (ref 0–0)
PLATELET # BLD AUTO: 242 X10*3/UL (ref 150–450)
POTASSIUM SERPL-SCNC: 3.8 MMOL/L (ref 3.5–5.3)
RBC # BLD AUTO: 3.38 X10*6/UL (ref 4–5.2)
SODIUM SERPL-SCNC: 142 MMOL/L (ref 136–145)
WBC # BLD AUTO: 7.3 X10*3/UL (ref 4.4–11.3)

## 2025-01-15 PROCEDURE — 36415 COLL VENOUS BLD VENIPUNCTURE: CPT | Performed by: NURSE PRACTITIONER

## 2025-01-15 PROCEDURE — 93246 EXT ECG>7D<15D RECORDING: CPT

## 2025-01-15 PROCEDURE — 2500000004 HC RX 250 GENERAL PHARMACY W/ HCPCS (ALT 636 FOR OP/ED): Performed by: NURSE PRACTITIONER

## 2025-01-15 PROCEDURE — 80048 BASIC METABOLIC PNL TOTAL CA: CPT | Performed by: NURSE PRACTITIONER

## 2025-01-15 PROCEDURE — 85027 COMPLETE CBC AUTOMATED: CPT | Performed by: NURSE PRACTITIONER

## 2025-01-15 PROCEDURE — 2500000001 HC RX 250 WO HCPCS SELF ADMINISTERED DRUGS (ALT 637 FOR MEDICARE OP): Performed by: NURSE PRACTITIONER

## 2025-01-15 RX ADMIN — Medication 1000 MCG: at 08:05

## 2025-01-15 RX ADMIN — Medication 2000 UNITS: at 08:05

## 2025-01-15 RX ADMIN — POLYETHYLENE GLYCOL 3350 17 G: 17 POWDER, FOR SOLUTION ORAL at 08:05

## 2025-01-15 RX ADMIN — PANTOPRAZOLE SODIUM 40 MG: 40 TABLET, DELAYED RELEASE ORAL at 06:07

## 2025-01-15 RX ADMIN — ARIPIPRAZOLE 2 MG: 2 TABLET ORAL at 08:05

## 2025-01-15 ASSESSMENT — COGNITIVE AND FUNCTIONAL STATUS - GENERAL
HELP NEEDED FOR BATHING: A LITTLE
DAILY ACTIVITIY SCORE: 18
STANDING UP FROM CHAIR USING ARMS: A LITTLE
WALKING IN HOSPITAL ROOM: A LITTLE
DRESSING REGULAR UPPER BODY CLOTHING: A LITTLE
CLIMB 3 TO 5 STEPS WITH RAILING: A LOT
MOVING FROM LYING ON BACK TO SITTING ON SIDE OF FLAT BED WITH BEDRAILS: A LITTLE
TOILETING: A LOT
PERSONAL GROOMING: A LITTLE
MOVING TO AND FROM BED TO CHAIR: A LITTLE
MOBILITY SCORE: 17
TURNING FROM BACK TO SIDE WHILE IN FLAT BAD: A LITTLE
DRESSING REGULAR LOWER BODY CLOTHING: A LITTLE

## 2025-01-15 ASSESSMENT — PAIN SCALES - GENERAL: PAINLEVEL_OUTOF10: 0 - NO PAIN

## 2025-01-15 ASSESSMENT — PAIN - FUNCTIONAL ASSESSMENT: PAIN_FUNCTIONAL_ASSESSMENT: 0-10

## 2025-01-15 NOTE — DISCHARGE SUMMARY
Inpatient Discharge Summary    BRIEF OVERVIEW  Admitting Provider: Hernesto Palmer MD  Discharge Provider: Hernesto Palmer MD  Primary Care Physician at Discharge: Anam Quintero [phone]      Treatment Team:   Attending Provider: Hernesto Palmer MD  Patient Care Technician: Kristy Alexander  Patient Care Technician: John Lundberg  Licensed Practical Nurse: Luly Mcarthur LPN  Patient Care Technician: Stormy Monzon  Transitional Care Coordinator: Kalyn Sheth RN      Admission Date: 1/12/2025     Discharge Date: 1/15/2025    Primary Discharge Diagnosis  Assessment & Plan  Fall, initial encounter  Bradycardia  Dementia  Syncope ?      Discharge Disposition  Home  Code Status at Discharge: full    Active Issues Requiring Follow-up  hospital follow up, general health maintenance and medication refills.  Follow up with PCP and Cards    Outpatient Follow-Up  No future appointments.    [unfilled]    Test Results Pending at Discharge  Pending Labs       Order Current Status    Extra Urine Gray Tube Collected (01/13/25 0224)    Urinalysis with Reflex Culture and Microscopic In process                  DETAILS OF HOSPITAL STAY    Presenting Problem/History of Present Illness  Benign essential hypertension [I10]  Sinus bradycardia [R00.1]  Hypoxia [R09.02]  Fall, initial encounter [W19.XXXA]    History Of Present Illness  Teresita De La Paz is a 84 y.o. female presenting with history including Alzheimer's dementia, hypertension, GERD presenting to the emergency department from her nursing facility for an unwitnessed fall. .     Pt was evaluated in the ED and noted to have bradycardia and there was concern it it may have caused syncope and hence pt admitted  She is not able to provide any information   She is frogetful  N pain no difficulty breathing     Hospital Course       ASSESSMENT AND PLAN:      Assessment & Plan  Fall, initial encounter  Bradycardia  Dementia  Syncope ?  Echo noted  D/w cards >>  cleared   Will plan on dc to AL w holter monitor  Follow up as out patient          Your medication list        CONTINUE taking these medications        Instructions Last Dose Given Next Dose Due   ammonium lactate 12 % cream  Commonly known as: Amlactin           ARIPiprazole 2 mg tablet  Commonly known as: Abilify           cholecalciferol 50 MCG (2000 UT) tablet  Commonly known as: Vitamin D-3           cyanocobalamin 500 mcg tablet  Commonly known as: Vitamin B-12           estradiol 0.01 % (0.1 mg/gram) vaginal cream  Commonly known as: Estrace           omeprazole 20 mg DR capsule  Commonly known as: PriLOSEC                  STOP taking these medications      lisinopril 10 mg tablet        magnesium oxide 400 mg tablet  Commonly known as: Mag-Ox                    Physical Exam at Discharge  Discharge Condition: good  Heart Rate: 56  Resp: 17  BP: 118/60  Temp: 36.6 °C (97.9 °F)  Weight: 68 kg (150 lb)  agree [facility] discharge summary , reviewd and dw NP  Hernesto Palmer MD

## 2025-01-15 NOTE — CARE PLAN
The clinical goals for the shift include remain free from recurrence of fall    Problem: Pain - Adult  Goal: Verbalizes/displays adequate comfort level or baseline comfort level  Outcome: Progressing     Problem: Safety - Adult  Goal: Free from fall injury  Outcome: Progressing     Problem: Discharge Planning  Goal: Discharge to home or other facility with appropriate resources  Outcome: Progressing     Problem: Chronic Conditions and Co-morbidities  Goal: Patient's chronic conditions and co-morbidity symptoms are monitored and maintained or improved  Outcome: Progressing     Problem: Fall/Injury  Goal: Not fall by end of shift  Outcome: Progressing  Goal: Be free from injury by end of the shift  Outcome: Progressing  Goal: Verbalize understanding of personal risk factors for fall in the hospital  Outcome: Progressing  Goal: Verbalize understanding of risk factor reduction measures to prevent injury from fall in the home  Outcome: Progressing  Goal: Use assistive devices by end of the shift  Outcome: Progressing  Note: Front wheel walker  Goal: Pace activities to prevent fatigue by end of the shift  Outcome: Progressing

## 2025-01-15 NOTE — PROGRESS NOTES
INPATIENT PROGRESS NOTES    PRIMARY SERVICE: Hernesto Palmer MD   1/15/2025  8:52 AM    INTERVAL HPI: pt in bed  nad    PERTINENT ROS:  REVIEW OF SYSTEMS  GENERAL: negative for fever, SEE HPI  RESPIRATORY: Negative for cough, wheezing or shortness of breath.  CARDIOVASCULAR: Negative for chest pain, leg swelling or palpitations.  GI: Negative for abdominal discomfort, blood in stools or black stools or change in bowel habits  NEURO: no change per nursing  All other reviewed and negative other than HPI.      MEDICATIONS:  Scheduled medications  ARIPiprazole, 2 mg, oral, Daily  cholecalciferol, 2,000 Units, oral, Daily  cyanocobalamin, 1,000 mcg, oral, Daily  enoxaparin, 40 mg, subcutaneous, q24h  estradiol, 1 g, vaginal, Once per day on Monday Thursday  melatonin, 3 mg, oral, Nightly  pantoprazole, 40 mg, oral, Daily before breakfast  polyethylene glycol, 17 g, oral, Daily      Continuous medications     PRN medications  PRN medications: hydrOXYzine HCL, ondansetron **OR** ondansetron      PHYSICAL EXAM:       1/13/2025     9:33 PM 1/14/2025     5:29 AM 1/14/2025     7:32 AM 1/14/2025    12:06 PM 1/14/2025     3:11 PM 1/14/2025     7:27 PM 1/15/2025     7:30 AM   Vitals   Systolic 152 148 149 118 132 152 118   Diastolic 68 70 67 54 63 68 60   BP Location Left arm Left arm Right arm Right arm Right arm  Right arm   Heart Rate 75 88 67 72 64 77 56   Temp 37 °C (98.6 °F) 36.2 °C (97.2 °F) 36.3 °C (97.3 °F) 36.6 °C (97.8 °F) 36.2 °C (97.2 °F) 36.2 °C (97.1 °F) 36.6 °C (97.9 °F)   Resp 17 18 17 17 17 21 17          PHYSICAL EXAMINATION:    General appearance: well-hydrated, well nourished  Skin: skin color, texture, turgor normal,   HEENT: Anicteric sclera.  Oropharynx mucosa moist  Neck: Supple, no adenopathy;   Back: no pain to palpation over spine or costovertebral angles,   Lungs: clear to auscultation, no wheezing or rhonchi  Heart: RRR without murmur, gallop, or rubs.   Abdomen: Abdomen soft, non-tender. Bowel  sounds normal. No masses, organomegaly  Extremities: Extremities normal. No  edema, or skin discoloration.   Musculoskeletal: Spine range of motion normal. Muscular strength intact  Neuro: Oriented X  0  Left finger no sign of infection, sutruers in place    DATA: CBC, Coags, BMP, Mg, Phos   Scheduled medications  ARIPiprazole, 2 mg, oral, Daily  cholecalciferol, 2,000 Units, oral, Daily  cyanocobalamin, 1,000 mcg, oral, Daily  enoxaparin, 40 mg, subcutaneous, q24h  estradiol, 1 g, vaginal, Once per day on Monday Thursday  melatonin, 3 mg, oral, Nightly  pantoprazole, 40 mg, oral, Daily before breakfast  polyethylene glycol, 17 g, oral, Daily      Continuous medications     PRN medications  PRN medications: hydrOXYzine HCL, ondansetron **OR** ondansetron  Results for orders placed or performed during the hospital encounter of 01/12/25 (from the past 96 hours)   CBC and Auto Differential   Result Value Ref Range    WBC 6.9 4.4 - 11.3 x10*3/uL    nRBC 0.0 0.0 - 0.0 /100 WBCs    RBC 4.04 4.00 - 5.20 x10*6/uL    Hemoglobin 12.4 12.0 - 16.0 g/dL    Hematocrit 38.5 36.0 - 46.0 %    MCV 95 80 - 100 fL    MCH 30.7 26.0 - 34.0 pg    MCHC 32.2 32.0 - 36.0 g/dL    RDW 12.5 11.5 - 14.5 %    Platelets 290 150 - 450 x10*3/uL    Neutrophils % 63.2 40.0 - 80.0 %    Immature Granulocytes %, Automated 0.3 0.0 - 0.9 %    Lymphocytes % 23.3 13.0 - 44.0 %    Monocytes % 10.6 2.0 - 10.0 %    Eosinophils % 2.3 0.0 - 6.0 %    Basophils % 0.3 0.0 - 2.0 %    Neutrophils Absolute 4.34 1.60 - 5.50 x10*3/uL    Immature Granulocytes Absolute, Automated 0.02 0.00 - 0.50 x10*3/uL    Lymphocytes Absolute 1.60 0.80 - 3.00 x10*3/uL    Monocytes Absolute 0.73 0.05 - 0.80 x10*3/uL    Eosinophils Absolute 0.16 0.00 - 0.40 x10*3/uL    Basophils Absolute 0.02 0.00 - 0.10 x10*3/uL   Comprehensive metabolic panel   Result Value Ref Range    Glucose 107 (H) 74 - 99 mg/dL    Sodium 138 136 - 145 mmol/L    Potassium 4.1 3.5 - 5.3 mmol/L    Chloride 104 98 -  107 mmol/L    Bicarbonate 27 21 - 32 mmol/L    Anion Gap 11 10 - 20 mmol/L    Urea Nitrogen 23 6 - 23 mg/dL    Creatinine 1.17 (H) 0.50 - 1.05 mg/dL    eGFR 46 (L) >60 mL/min/1.73m*2    Calcium 9.1 8.6 - 10.3 mg/dL    Albumin 3.8 3.4 - 5.0 g/dL    Alkaline Phosphatase 89 33 - 136 U/L    Total Protein 6.0 (L) 6.4 - 8.2 g/dL    AST 12 9 - 39 U/L    Bilirubin, Total 0.3 0.0 - 1.2 mg/dL    ALT 8 7 - 45 U/L   Troponin I, High Sensitivity, Initial   Result Value Ref Range    Troponin I, High Sensitivity 3 0 - 13 ng/L   B-type Natriuretic Peptide   Result Value Ref Range    BNP 16 0 - 99 pg/mL   ECG 12 lead   Result Value Ref Range    Ventricular Rate 88 BPM    Atrial Rate 88 BPM    TN Interval 176 ms    QRS Duration 136 ms    QT Interval 388 ms    QTC Calculation(Bazett) 469 ms    P Axis 66 degrees    R Axis 79 degrees    T Axis 38 degrees    QRS Count 14 beats    Q Onset 223 ms    P Onset 135 ms    P Offset 200 ms    T Offset 417 ms    QTC Fredericia 441 ms   Troponin, High Sensitivity, 1 Hour   Result Value Ref Range    Troponin I, High Sensitivity 11 0 - 13 ng/L   ECG 12 lead   Result Value Ref Range    Ventricular Rate 82 BPM    Atrial Rate 82 BPM    TN Interval 170 ms    QRS Duration 144 ms    QT Interval 422 ms    QTC Calculation(Bazett) 493 ms    P Axis 73 degrees    R Axis 87 degrees    T Axis 23 degrees    QRS Count 13 beats    Q Onset 223 ms    P Onset 138 ms    P Offset 195 ms    T Offset 434 ms    QTC Fredericia 468 ms   Urinalysis with Reflex Culture and Microscopic   Result Value Ref Range    Color, Urine Light-Yellow Light-Yellow, Yellow, Dark-Yellow    Appearance, Urine Clear Clear    Specific Gravity, Urine 1.011 1.005 - 1.035    pH, Urine 5.5 5.0, 5.5, 6.0, 6.5, 7.0, 7.5, 8.0    Protein, Urine NEGATIVE NEGATIVE, 10 (TRACE), 20 (TRACE) mg/dL    Glucose, Urine Normal Normal mg/dL    Blood, Urine NEGATIVE NEGATIVE    Ketones, Urine NEGATIVE NEGATIVE mg/dL    Bilirubin, Urine NEGATIVE NEGATIVE     Urobilinogen, Urine Normal Normal mg/dL    Nitrite, Urine NEGATIVE NEGATIVE    Leukocyte Esterase, Urine NEGATIVE NEGATIVE   Sars-CoV-2 and Influenza A/B PCR   Result Value Ref Range    Flu A Result Not Detected Not Detected    Flu B Result Not Detected Not Detected    Coronavirus 2019, PCR Not Detected Not Detected   Basic metabolic panel   Result Value Ref Range    Glucose 78 74 - 99 mg/dL    Sodium 142 136 - 145 mmol/L    Potassium 3.5 3.5 - 5.3 mmol/L    Chloride 107 98 - 107 mmol/L    Bicarbonate 30 21 - 32 mmol/L    Anion Gap 9 (L) 10 - 20 mmol/L    Urea Nitrogen 16 6 - 23 mg/dL    Creatinine 1.08 (H) 0.50 - 1.05 mg/dL    eGFR 50 (L) >60 mL/min/1.73m*2    Calcium 8.5 (L) 8.6 - 10.3 mg/dL   CBC   Result Value Ref Range    WBC 7.8 4.4 - 11.3 x10*3/uL    nRBC 0.0 0.0 - 0.0 /100 WBCs    RBC 3.59 (L) 4.00 - 5.20 x10*6/uL    Hemoglobin 11.0 (L) 12.0 - 16.0 g/dL    Hematocrit 34.7 (L) 36.0 - 46.0 %    MCV 97 80 - 100 fL    MCH 30.6 26.0 - 34.0 pg    MCHC 31.7 (L) 32.0 - 36.0 g/dL    RDW 12.4 11.5 - 14.5 %    Platelets 263 150 - 450 x10*3/uL   Transthoracic Echo (TTE) Complete   Result Value Ref Range    AV pk malorie 1.72 m/s    AV mn grad 6 mmHg    LVOT diam 1.95 cm    MV E/A ratio 0.82     LA vol index A/L 31.5 ml/m2    Tricuspid annular plane systolic excursion 3.0 cm    LV EF 71 %    RV free wall pk S' 15.61 cm/s    LVIDd 4.90 cm    RVSP 27.6 mmHg    Aortic Valve Area by Continuity of VTI 2.15 cm2    Aortic Valve Area by Continuity of Peak Velocity 2.47 cm2    AV pk grad 12 mmHg    LV A4C EF 65.2    Basic metabolic panel   Result Value Ref Range    Glucose 84 74 - 99 mg/dL    Sodium 142 136 - 145 mmol/L    Potassium 3.8 3.5 - 5.3 mmol/L    Chloride 109 (H) 98 - 107 mmol/L    Bicarbonate 29 21 - 32 mmol/L    Anion Gap 8 (L) 10 - 20 mmol/L    Urea Nitrogen 17 6 - 23 mg/dL    Creatinine 1.04 0.50 - 1.05 mg/dL    eGFR 53 (L) >60 mL/min/1.73m*2    Calcium 8.4 (L) 8.6 - 10.3 mg/dL   CBC   Result Value Ref Range    WBC 7.3  4.4 - 11.3 x10*3/uL    nRBC 0.0 0.0 - 0.0 /100 WBCs    RBC 3.38 (L) 4.00 - 5.20 x10*6/uL    Hemoglobin 10.4 (L) 12.0 - 16.0 g/dL    Hematocrit 32.9 (L) 36.0 - 46.0 %    MCV 97 80 - 100 fL    MCH 30.8 26.0 - 34.0 pg    MCHC 31.6 (L) 32.0 - 36.0 g/dL    RDW 12.6 11.5 - 14.5 %    Platelets 242 150 - 450 x10*3/uL           ASSESSMENT AND PLAN:     Assessment & Plan  Fall, initial encounter  Bradycardia  Dementia  Syncope ?  Echo noted  D/w cards >> cleared   Will plan on dc to AL w holter monitor  Follow up as out patient     Plan of care discussed with: Provider, RN, Patient.      ##Transcribed for Dr. BREANNA Palmer##  I have reviewed the above note obtained and documented by the NP/PA and I personally participated in the key components. I have discussed the case and management of the patient's care. Changes made to the note, and all key components of history and physical/progress note done by me.  dw nursing  No events overnight   Will dc to marc Palmer MD

## 2025-01-15 NOTE — PROGRESS NOTES
01/15/25 1011   Discharge Planning   Care Facility Name [facility]   Home or Post Acute Services Post acute facilities (Rehab/SNF/etc)   Type of Post Acute Facility Services Assisted living   Expected Discharge Disposition Home   Does the patient need discharge transport arranged? Yes   RoundTrip coordination needed? Yes   Has discharge transport been arranged? Yes   What day is the transport expected? 01/15/25     Patient is medically ready for discharge  They are being discharged to:  [facility] [phone]  __Physicians________ will pick patient up    Nurse to work with  to schedule transportation back to her AL.   Will call family to let them know she will return today.    Left  for daughter Louisa

## 2025-01-15 NOTE — NURSING NOTE
0800 A&O x self. Sitting up in bed. Sitter in room. No complaints of pain.Due meds tolerated well. BLE edema +1. 0900 discharge orders received. 1030 Holter monitor placed. 1120 report called to MARIANA Dougherty.

## 2025-02-03 LAB
ATRIAL RATE: 88 BPM
P AXIS: 66 DEGREES
P OFFSET: 200 MS
P ONSET: 135 MS
PR INTERVAL: 176 MS
Q ONSET: 223 MS
QRS COUNT: 14 BEATS
QRS DURATION: 136 MS
QT INTERVAL: 388 MS
QTC CALCULATION(BAZETT): 469 MS
QTC FREDERICIA: 441 MS
R AXIS: 79 DEGREES
T AXIS: 38 DEGREES
T OFFSET: 417 MS
VENTRICULAR RATE: 88 BPM

## 2025-02-05 LAB
ATRIAL RATE: 82 BPM
P AXIS: 73 DEGREES
P OFFSET: 195 MS
P ONSET: 138 MS
PR INTERVAL: 170 MS
Q ONSET: 223 MS
QRS COUNT: 13 BEATS
QRS DURATION: 144 MS
QT INTERVAL: 422 MS
QTC CALCULATION(BAZETT): 493 MS
QTC FREDERICIA: 468 MS
R AXIS: 87 DEGREES
T AXIS: 23 DEGREES
T OFFSET: 434 MS
VENTRICULAR RATE: 82 BPM

## 2025-02-09 ENCOUNTER — HOSPITAL ENCOUNTER (EMERGENCY)
Facility: HOSPITAL | Age: 85
Discharge: HOME | End: 2025-02-10
Attending: EMERGENCY MEDICINE
Payer: MEDICARE

## 2025-02-09 ENCOUNTER — APPOINTMENT (OUTPATIENT)
Dept: RADIOLOGY | Facility: HOSPITAL | Age: 85
End: 2025-02-09
Payer: MEDICARE
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
## 2025-02-09 PROCEDURE — 71045 X-RAY EXAM CHEST 1 VIEW: CPT | Mod: FOREIGN READ | Performed by: RADIOLOGY

## 2025-02-09 PROCEDURE — 72125 CT NECK SPINE W/O DYE: CPT | Performed by: RADIOLOGY

## 2025-02-09 PROCEDURE — 72125 CT NECK SPINE W/O DYE: CPT

## 2025-02-09 PROCEDURE — 70486 CT MAXILLOFACIAL W/O DYE: CPT | Performed by: RADIOLOGY

## 2025-02-09 PROCEDURE — 72170 X-RAY EXAM OF PELVIS: CPT

## 2025-02-09 PROCEDURE — 70450 CT HEAD/BRAIN W/O DYE: CPT

## 2025-02-09 PROCEDURE — 70450 CT HEAD/BRAIN W/O DYE: CPT | Performed by: RADIOLOGY

## 2025-02-09 PROCEDURE — 99284 EMERGENCY DEPT VISIT MOD MDM: CPT | Mod: 25 | Performed by: EMERGENCY MEDICINE

## 2025-02-09 PROCEDURE — 70486 CT MAXILLOFACIAL W/O DYE: CPT

## 2025-02-09 PROCEDURE — 71045 X-RAY EXAM CHEST 1 VIEW: CPT

## 2025-02-09 PROCEDURE — 76377 3D RENDER W/INTRP POSTPROCES: CPT

## 2025-02-09 ASSESSMENT — PAIN SCALES - GENERAL: PAINLEVEL_OUTOF10: 0 - NO PAIN

## 2025-02-09 ASSESSMENT — PAIN - FUNCTIONAL ASSESSMENT: PAIN_FUNCTIONAL_ASSESSMENT: 0-10

## 2025-02-10 VITALS
DIASTOLIC BLOOD PRESSURE: 92 MMHG | HEART RATE: 70 BPM | BODY MASS INDEX: 22.73 KG/M2 | OXYGEN SATURATION: 98 % | WEIGHT: 150 LBS | SYSTOLIC BLOOD PRESSURE: 126 MMHG | HEIGHT: 68 IN

## 2025-02-10 NOTE — DISCHARGE INSTRUCTIONS
Please follow-up with your physician at your facility to continue care.  Please return to the ED for worsening difficulty moving arms legs, change in behavior or any other concerning symptoms.

## 2025-02-10 NOTE — ED PROVIDER NOTES
HPI   Chief Complaint   Patient presents with   • Fall     Unwitnessed        This is 84-year-old woman with past medical history of dementia, who does present status post fall at her facility.  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  She denies any chest or abdominal pain.  Denies any pain over arms or legs.            Patient History   Past Medical History:   Diagnosis Date   • Personal history of other diseases of the digestive system 2021    History of diverticulitis of colon   • Personal history of other diseases of the digestive system 2021    History of rectal abscess     Past Surgical History:   Procedure Laterality Date   •  SECTION, CLASSIC  2017     Section   • HYSTERECTOMY  2017    Hysterectomy   • TOTAL KNEE ARTHROPLASTY  2017    Knee Replacement     No family history on file.  Social History     Tobacco Use   • Smoking status: Former     Types: Cigarettes   • Smokeless tobacco: Former   Substance Use Topics   • Alcohol use: Never   • Drug use: Never       Physical Exam   ED Triage Vitals [25]   Temp Heart Rate Resp BP   -- 70 -- 140/67      Pulse Ox Temp src Heart Rate Source Patient Position   97 % -- Monitor Sitting      BP Location FiO2 (%)     Right arm --       Physical Exam  Vitals and nursing note reviewed.   Constitutional:       General: She is not in acute distress.     Appearance: Normal appearance. She is normal weight. She is not ill-appearing.   HENT:      Head: Normocephalic and atraumatic.      Nose: Nose normal.      Mouth/Throat:      Mouth: Mucous membranes are moist.      Pharynx: Oropharynx is clear.   Eyes:      Extraocular Movements: Extraocular movements intact.      Conjunctiva/sclera: Conjunctivae normal.      Pupils: Pupils are equal, round, and reactive to light.   Cardiovascular:      Rate and Rhythm: Normal rate and regular rhythm.      Pulses: Normal pulses.      Heart  sounds: Normal heart sounds.   Pulmonary:      Effort: Pulmonary effort is normal.      Breath sounds: Normal breath sounds.   Abdominal:      General: Abdomen is flat. Bowel sounds are normal. There is no distension.      Palpations: Abdomen is soft.      Tenderness: There is no abdominal tenderness. There is no right CVA tenderness, left CVA tenderness, guarding or rebound.   Musculoskeletal:         General: No deformity. Normal range of motion.      Cervical back: Normal range of motion and neck supple.      Right lower leg: Edema present.      Left lower leg: Edema present.      Comments: Positive chronic bilateral edema   Skin:     General: Skin is warm and dry.      Capillary Refill: Capillary refill takes less than 2 seconds.      Comments: Small laceration noted to left cheek as well as on left nose   Neurological:      General: No focal deficit present.      Mental Status: She is alert. Mental status is at baseline.      Sensory: No sensory deficit.      Motor: No weakness.      Comments: Positive dementia appears at baseline   Psychiatric:      Comments: Dementia           ED Course & MDM   Diagnoses as of 02/09/25 2211   Fall, initial encounter                 No data recorded     James Coma Scale Score: 14 (02/09/25 2035 : Yazmin Sanchez RN)                           Medical Decision Making  Patient was sent for CT head, CT face, CT cervical spine and chest x-ray and pelvis x-ray these were all negative for acute pathology.  CT head and CT cervical spine were negative for acute pathology.  Small abrasions were noted to the cheeks however these are not requiring any repair at this time.  Her chest x-ray and pelvis x-ray were negative.  She may safely discharged back to her facility continue care.  Return precautions were discussed.    Amount and/or Complexity of Data Reviewed  Radiology: ordered. Decision-making details documented in ED Course.        Procedure  Procedures     Ishan Weaver  MD  02/09/25 5256

## 2025-02-10 NOTE — ED TRIAGE NOTES
Patient brought to ED per squad for c/o unwitnessed fall at nursing facility. Patient on dementia unit. Arrives with small lac to L side of nose and L cheek. No complaints of pain at this time. A&Ox 2. Patient is no on blood thinners.

## 2025-03-01 ENCOUNTER — HOSPITAL ENCOUNTER (EMERGENCY)
Facility: HOSPITAL | Age: 85
Discharge: HOME | End: 2025-03-01
Attending: EMERGENCY MEDICINE
Payer: MEDICARE

## 2025-03-01 ENCOUNTER — APPOINTMENT (OUTPATIENT)
Dept: RADIOLOGY | Facility: HOSPITAL | Age: 85
End: 2025-03-01
Payer: MEDICARE

## 2025-03-01 VITALS
HEIGHT: 67 IN | RESPIRATION RATE: 16 BRPM | HEART RATE: 60 BPM | TEMPERATURE: 97.6 F | WEIGHT: 200 LBS | BODY MASS INDEX: 31.39 KG/M2 | OXYGEN SATURATION: 98 % | SYSTOLIC BLOOD PRESSURE: 125 MMHG | DIASTOLIC BLOOD PRESSURE: 66 MMHG

## 2025-03-01 DIAGNOSIS — S00.03XA CONTUSION OF SCALP, INITIAL ENCOUNTER: ICD-10-CM
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
## 2025-03-01 PROCEDURE — 72170 X-RAY EXAM OF PELVIS: CPT | Performed by: RADIOLOGY

## 2025-03-01 PROCEDURE — 71045 X-RAY EXAM CHEST 1 VIEW: CPT | Performed by: RADIOLOGY

## 2025-03-01 PROCEDURE — 70450 CT HEAD/BRAIN W/O DYE: CPT | Performed by: RADIOLOGY

## 2025-03-01 PROCEDURE — 72125 CT NECK SPINE W/O DYE: CPT

## 2025-03-01 PROCEDURE — 99284 EMERGENCY DEPT VISIT MOD MDM: CPT | Mod: 25 | Performed by: EMERGENCY MEDICINE

## 2025-03-01 PROCEDURE — 72170 X-RAY EXAM OF PELVIS: CPT

## 2025-03-01 PROCEDURE — 70450 CT HEAD/BRAIN W/O DYE: CPT

## 2025-03-01 PROCEDURE — 72125 CT NECK SPINE W/O DYE: CPT | Performed by: RADIOLOGY

## 2025-03-01 PROCEDURE — 71045 X-RAY EXAM CHEST 1 VIEW: CPT

## 2025-03-01 ASSESSMENT — PAIN SCALES - GENERAL
PAINLEVEL_OUTOF10: 0 - NO PAIN

## 2025-03-01 ASSESSMENT — COLUMBIA-SUICIDE SEVERITY RATING SCALE - C-SSRS
1. IN THE PAST MONTH, HAVE YOU WISHED YOU WERE DEAD OR WISHED YOU COULD GO TO SLEEP AND NOT WAKE UP?: NO
6. HAVE YOU EVER DONE ANYTHING, STARTED TO DO ANYTHING, OR PREPARED TO DO ANYTHING TO END YOUR LIFE?: NO
2. HAVE YOU ACTUALLY HAD ANY THOUGHTS OF KILLING YOURSELF?: NO

## 2025-03-01 ASSESSMENT — PAIN DESCRIPTION - PROGRESSION: CLINICAL_PROGRESSION: NOT CHANGED

## 2025-03-01 ASSESSMENT — PAIN - FUNCTIONAL ASSESSMENT
PAIN_FUNCTIONAL_ASSESSMENT: 0-10
PAIN_FUNCTIONAL_ASSESSMENT: 0-10

## 2025-03-01 NOTE — ED TRIAGE NOTES
From nursing home/ memory care unit by ems.  Came in for fall.  Unwitnessed by staff. They found her on the floor with bleeding coming from head.  Pt has hx of dementia X1-2 at baseline. She is not able to recall the circumstances that led to her fall.  Pt complains of pain to forehead area.  Appears to have small skin avulsion on forehead. Bleeding controlled.     No thinners.

## 2025-03-01 NOTE — ED NOTES
Report given to [facility]. No significant detrimental changes prior to handoff. Neuro/skin/respiratory grossly WDL.      Gurpreet Almanza RN  03/01/25 3550

## 2025-03-01 NOTE — ED PROVIDER NOTES
HPI   Chief Complaint   Patient presents with   • Fall       HPI: []  84-year-old white female history of advanced dementia, hypertension on aspirin lives in a memory care unit comes in with an unwitnessed mechanical fall.  She states she slipped and fell.  No LOC.  Sustained some abrasion to her scalp.  She denies any chest pain pressure heaviness fever chills nausea vomit diarrhea cough congestion incontinence seizures syncope onus syncope she is currently Obesimed status denies neck pain or back pain and denies altered mental status.    History: Dementia, hypertension  Social: Patient denies current tobacco alcohol drug abuse.  REVIEW OF SYSTEMS:    GENERAL.: No weight loss, fatigue, anorexia, insomnia, fever.    EYES: No vision loss, double vision, drainage, eye pain.    ENT: No pharyngitis, dry mouth.    CARDIOPULMONARY: No chest pain, palpitations, syncope, near syncope. No shortness of breath, cough, hemoptysis.    GI: No abdominal pain, change in bowel habits, melena, hematemesis, hematochezia, nausea, vomiting, diarrhea.    : No discharge, dysuria, frequency, urgency, hematuria.    MS: No limb pain, joint pain, joint swelling.    SKIN: No rashes.  Positive for abrasions and scalp contusion    PSYCH: No depression, anxiety, suicidality, homicidality.    Review of systems is otherwise negative unless stated above or in history of present illness.  Social history, family history, allergies reviewed.  PHYSICAL EXAM:    GENERAL: Vitals noted, no distress. Alert and oriented  x 2 very pleasant non-toxic.      EENT: TMs clear. Posterior oropharynx unremarkable. No meningismus. No LAD.  Negative hemotympanum negative Ariza sign negative mastoid tenderness    NECK: Supple. Nontender. No midline tenderness.     CARDIAC: Regular, rate, rhythm. No murmurs rubs or gallops. No JVD    PULMONARY: Lungs clear bilaterally with good aeration. No wheezes rales or rhonchi. No respiratory distress.     ABDOMEN: Soft,  nonsurgical. Nontender. No peritoneal signs. Normoactive bowel sounds. No pulsatile masses.     EXTREMITIES: Plus bilateral chronic appearing pitting peripheral edema. Negative Homans bilaterally, no cords.  2+ bounding pulses well-perfused.    SKIN: No rash. Intact.  Chronic appearing venous dermatitis lower extremities bilaterally.  Abrasions to the scalp.  No obvious laceration.    NEURO: No focal neurologic deficits, NIH score of 0. Cranial nerves normal as tested from II through XII.   Musculoskeletal: Patient no midline C, T, L-spine tenderness.  Pelvis stable good range of motion with hip knees and ankles.  Given limitation of her age and body habitus.  MEDICAL DECISION MAKING:  CT head negative, CT C-spine showed DJD no fractures x-ray of the chest was negative x-ray pelvis negative.    Treatment in the ED: Patient C-spine was cleared clinically and radiologically.  ED course: Patient remained stable hemodynamic.  Impression: #1 mechanical fall, 2 scalp contusion, #3 scalp abrasion  Plan set MDM: 84-year-old white female on aspirin lives in a memory unit exam of dementia comes in with a mechanical fall, currently her primary second survey is unremarkable.  He has some scabbed abrasions, no obvious laceration, her baseline mental status, my suspicion for traumatic injury of C-spine T-spine L-spine is negative suspicion for stroke TIA infection dehydration low.  Patient at baseline mental status.  Will be discharged back to nursing facility advised furtive care fall precaution local wound care with strict return precaution.              Patient History   Past Medical History:   Diagnosis Date   • Personal history of other diseases of the digestive system 2021    History of diverticulitis of colon   • Personal history of other diseases of the digestive system 2021    History of rectal abscess     Past Surgical History:   Procedure Laterality Date   •  SECTION, CLASSIC  2017      Section   • HYSTERECTOMY  07/18/2017    Hysterectomy   • TOTAL KNEE ARTHROPLASTY  07/18/2017    Knee Replacement     No family history on file.  Social History     Tobacco Use   • Smoking status: Former     Types: Cigarettes   • Smokeless tobacco: Former   Substance Use Topics   • Alcohol use: Never   • Drug use: Never       Physical Exam   ED Triage Vitals [03/01/25 0247]   Temperature Heart Rate Respirations BP   36.4 °C (97.5 °F) 76 18 110/62      Pulse Ox Temp src Heart Rate Source Patient Position   96 % -- -- --      BP Location FiO2 (%)     -- --       Physical Exam      ED Course & [facility]   ED Course as of 03/01/25 0548   Sat Mar 01, 2025   0544 CT head negative for traumatic injury CT C-spine showed DJD no fractures x-ray of the chest and pelvis negative.  Patient will be discharged back to her nursing facility [[place]]      ED Course User Index  [[place]] Praveena Warren MD         Diagnoses as of 03/01/25 0548   Fall, initial encounter   Contusion of scalp, initial encounter                 No data recorded     James Coma Scale Score: 14 (03/01/25 0312 : John Yang, [facility])                           Medical Decision Making      Procedure  Procedures     Praveena Warren MD  03/01/25 0533

## 2025-05-19 ENCOUNTER — APPOINTMENT (OUTPATIENT)
Dept: OPHTHALMOLOGY | Facility: CLINIC | Age: 85
End: 2025-05-19
Payer: MEDICARE